# Patient Record
Sex: FEMALE | NOT HISPANIC OR LATINO | Employment: FULL TIME | URBAN - METROPOLITAN AREA
[De-identification: names, ages, dates, MRNs, and addresses within clinical notes are randomized per-mention and may not be internally consistent; named-entity substitution may affect disease eponyms.]

---

## 2017-11-28 ENCOUNTER — CONVERSION ENCOUNTER (OUTPATIENT)
Dept: OTHER | Facility: HOSPITAL | Age: 52
End: 2017-11-28

## 2018-02-01 ENCOUNTER — OFFICE VISIT CONVERTED (OUTPATIENT)
Dept: FAMILY MEDICINE CLINIC | Age: 53
End: 2018-02-01
Attending: NURSE PRACTITIONER

## 2018-12-03 ENCOUNTER — OFFICE VISIT CONVERTED (OUTPATIENT)
Dept: FAMILY MEDICINE CLINIC | Age: 53
End: 2018-12-03
Attending: NURSE PRACTITIONER

## 2019-11-15 ENCOUNTER — OFFICE VISIT CONVERTED (OUTPATIENT)
Dept: FAMILY MEDICINE CLINIC | Age: 54
End: 2019-11-15
Attending: NURSE PRACTITIONER

## 2019-12-06 ENCOUNTER — HOSPITAL ENCOUNTER (OUTPATIENT)
Dept: OTHER | Facility: HOSPITAL | Age: 54
Discharge: HOME | End: 2019-12-06
Attending: NURSE PRACTITIONER

## 2020-01-08 ENCOUNTER — OFFICE VISIT CONVERTED (OUTPATIENT)
Dept: FAMILY MEDICINE CLINIC | Age: 55
End: 2020-01-08
Attending: NURSE PRACTITIONER

## 2020-06-08 ENCOUNTER — HOSPITAL ENCOUNTER (OUTPATIENT)
Dept: OTHER | Facility: HOSPITAL | Age: 55
Discharge: HOME | End: 2020-06-08
Attending: NURSE PRACTITIONER

## 2020-06-08 ENCOUNTER — OFFICE VISIT CONVERTED (OUTPATIENT)
Dept: FAMILY MEDICINE CLINIC | Age: 55
End: 2020-06-08
Attending: NURSE PRACTITIONER

## 2020-06-08 LAB
ALBUMIN SERPL-MCNC: 4.1 G/DL (ref 3.5–5)
ALBUMIN/GLOB SERPL: 1.4 {RATIO} (ref 1.4–2.6)
ALP SERPL-CCNC: 150 U/L (ref 53–141)
ALT SERPL-CCNC: 17 U/L (ref 10–40)
ANION GAP SERPL CALC-SCNC: 16 MMOL/L (ref 8–19)
AST SERPL-CCNC: 20 U/L (ref 15–50)
BASOPHILS # BLD AUTO: 0.04 10*3/UL (ref 0–0.2)
BASOPHILS NFR BLD AUTO: 0.5 % (ref 0–3)
BILIRUB SERPL-MCNC: 0.72 MG/DL (ref 0.2–1.3)
BUN SERPL-MCNC: 31 MG/DL (ref 5–25)
BUN/CREAT SERPL: 19 {RATIO} (ref 6–20)
CALCIUM SERPL-MCNC: 9.1 MG/DL (ref 8.7–10.4)
CHLORIDE SERPL-SCNC: 101 MMOL/L (ref 99–111)
CONV ABS IMM GRAN: 0.05 10*3/UL (ref 0–0.2)
CONV CO2: 24 MMOL/L (ref 22–32)
CONV IMMATURE GRAN: 0.7 % (ref 0–1.8)
CONV TOTAL PROTEIN: 7 G/DL (ref 6.3–8.2)
CREAT UR-MCNC: 1.6 MG/DL (ref 0.5–0.9)
DEPRECATED RDW RBC AUTO: 41.6 FL (ref 36.4–46.3)
EOSINOPHIL # BLD AUTO: 0.08 10*3/UL (ref 0–0.7)
EOSINOPHIL # BLD AUTO: 1.1 % (ref 0–7)
ERYTHROCYTE [DISTWIDTH] IN BLOOD BY AUTOMATED COUNT: 12.9 % (ref 11.7–14.4)
GFR SERPLBLD BASED ON 1.73 SQ M-ARVRAT: 36 ML/MIN/{1.73_M2}
GLOBULIN UR ELPH-MCNC: 2.9 G/DL (ref 2–3.5)
GLUCOSE SERPL-MCNC: 89 MG/DL (ref 65–99)
HCT VFR BLD AUTO: 36.5 % (ref 37–47)
HGB BLD-MCNC: 11.5 G/DL (ref 12–16)
LYMPHOCYTES # BLD AUTO: 1.42 10*3/UL (ref 1–5)
LYMPHOCYTES NFR BLD AUTO: 18.7 % (ref 20–45)
MCH RBC QN AUTO: 27.8 PG (ref 27–31)
MCHC RBC AUTO-ENTMCNC: 31.5 G/DL (ref 33–37)
MCV RBC AUTO: 88.2 FL (ref 81–99)
MONOCYTES # BLD AUTO: 0.49 10*3/UL (ref 0.2–1.2)
MONOCYTES NFR BLD AUTO: 6.4 % (ref 3–10)
NEUTROPHILS # BLD AUTO: 5.52 10*3/UL (ref 2–8)
NEUTROPHILS NFR BLD AUTO: 72.6 % (ref 30–85)
NRBC CBCN: 0 % (ref 0–0.7)
OSMOLALITY SERPL CALC.SUM OF ELEC: 290 MOSM/KG (ref 273–304)
PLATELET # BLD AUTO: 333 10*3/UL (ref 130–400)
PMV BLD AUTO: 9.8 FL (ref 9.4–12.3)
POTASSIUM SERPL-SCNC: 4 MMOL/L (ref 3.5–5.3)
RBC # BLD AUTO: 4.14 10*6/UL (ref 4.2–5.4)
SODIUM SERPL-SCNC: 137 MMOL/L (ref 135–147)
WBC # BLD AUTO: 7.6 10*3/UL (ref 4.8–10.8)

## 2021-04-01 ENCOUNTER — OFFICE VISIT CONVERTED (OUTPATIENT)
Dept: FAMILY MEDICINE CLINIC | Age: 56
End: 2021-04-01
Attending: NURSE PRACTITIONER

## 2021-05-15 ENCOUNTER — OFFICE VISIT CONVERTED (OUTPATIENT)
Dept: FAMILY MEDICINE CLINIC | Age: 56
End: 2021-05-15
Attending: FAMILY MEDICINE

## 2021-05-17 ENCOUNTER — OFFICE VISIT CONVERTED (OUTPATIENT)
Dept: FAMILY MEDICINE CLINIC | Age: 56
End: 2021-05-17
Attending: NURSE PRACTITIONER

## 2021-05-17 ENCOUNTER — HOSPITAL ENCOUNTER (OUTPATIENT)
Dept: OTHER | Facility: HOSPITAL | Age: 56
Discharge: HOME | End: 2021-05-17
Attending: NURSE PRACTITIONER

## 2021-05-21 ENCOUNTER — OFFICE VISIT CONVERTED (OUTPATIENT)
Dept: FAMILY MEDICINE CLINIC | Age: 56
End: 2021-05-21
Attending: NURSE PRACTITIONER

## 2021-05-27 ENCOUNTER — OFFICE VISIT CONVERTED (OUTPATIENT)
Dept: FAMILY MEDICINE CLINIC | Age: 56
End: 2021-05-27
Attending: NURSE PRACTITIONER

## 2021-06-09 ENCOUNTER — TELEPHONE (OUTPATIENT)
Dept: FAMILY MEDICINE CLINIC | Age: 56
End: 2021-06-09

## 2021-07-01 VITALS
HEIGHT: 65 IN | SYSTOLIC BLOOD PRESSURE: 150 MMHG | BODY MASS INDEX: 31.25 KG/M2 | TEMPERATURE: 97.7 F | HEART RATE: 100 BPM | WEIGHT: 187.6 LBS | DIASTOLIC BLOOD PRESSURE: 87 MMHG

## 2021-07-01 VITALS
TEMPERATURE: 99.2 F | HEIGHT: 65 IN | BODY MASS INDEX: 30.49 KG/M2 | WEIGHT: 183 LBS | DIASTOLIC BLOOD PRESSURE: 80 MMHG | SYSTOLIC BLOOD PRESSURE: 135 MMHG | HEART RATE: 100 BPM

## 2021-07-01 VITALS
SYSTOLIC BLOOD PRESSURE: 130 MMHG | WEIGHT: 187.6 LBS | TEMPERATURE: 98.6 F | HEART RATE: 80 BPM | HEIGHT: 65 IN | BODY MASS INDEX: 31.25 KG/M2 | DIASTOLIC BLOOD PRESSURE: 78 MMHG

## 2021-07-02 VITALS
TEMPERATURE: 98.3 F | BODY MASS INDEX: 31.09 KG/M2 | DIASTOLIC BLOOD PRESSURE: 77 MMHG | HEIGHT: 65 IN | WEIGHT: 186.6 LBS | HEART RATE: 75 BPM | SYSTOLIC BLOOD PRESSURE: 142 MMHG

## 2021-07-02 VITALS
WEIGHT: 188.8 LBS | HEART RATE: 104 BPM | BODY MASS INDEX: 31.46 KG/M2 | TEMPERATURE: 97.7 F | HEIGHT: 65 IN | DIASTOLIC BLOOD PRESSURE: 90 MMHG | SYSTOLIC BLOOD PRESSURE: 149 MMHG

## 2021-07-02 VITALS
SYSTOLIC BLOOD PRESSURE: 142 MMHG | HEART RATE: 89 BPM | TEMPERATURE: 98.4 F | BODY MASS INDEX: 31.69 KG/M2 | HEIGHT: 65 IN | WEIGHT: 190.2 LBS | DIASTOLIC BLOOD PRESSURE: 83 MMHG

## 2021-07-02 VITALS
HEIGHT: 65 IN | TEMPERATURE: 97.4 F | SYSTOLIC BLOOD PRESSURE: 107 MMHG | BODY MASS INDEX: 30.92 KG/M2 | WEIGHT: 185.6 LBS | DIASTOLIC BLOOD PRESSURE: 70 MMHG | HEART RATE: 81 BPM

## 2021-07-02 VITALS
TEMPERATURE: 97.2 F | SYSTOLIC BLOOD PRESSURE: 135 MMHG | HEART RATE: 68 BPM | WEIGHT: 194.6 LBS | HEIGHT: 65 IN | BODY MASS INDEX: 32.42 KG/M2 | DIASTOLIC BLOOD PRESSURE: 81 MMHG

## 2021-07-02 VITALS
BODY MASS INDEX: 32.72 KG/M2 | SYSTOLIC BLOOD PRESSURE: 146 MMHG | HEART RATE: 76 BPM | TEMPERATURE: 97.7 F | WEIGHT: 196.4 LBS | DIASTOLIC BLOOD PRESSURE: 80 MMHG | HEIGHT: 65 IN

## 2021-07-15 ENCOUNTER — OFFICE VISIT (OUTPATIENT)
Dept: FAMILY MEDICINE CLINIC | Age: 56
End: 2021-07-15

## 2021-07-15 VITALS
DIASTOLIC BLOOD PRESSURE: 74 MMHG | TEMPERATURE: 99 F | SYSTOLIC BLOOD PRESSURE: 135 MMHG | BODY MASS INDEX: 32.49 KG/M2 | WEIGHT: 195 LBS | HEIGHT: 65 IN | HEART RATE: 78 BPM

## 2021-07-15 DIAGNOSIS — B02.29 POSTHERPETIC NEURALGIA: Primary | ICD-10-CM

## 2021-07-15 PROCEDURE — 99213 OFFICE O/P EST LOW 20 MIN: CPT | Performed by: NURSE PRACTITIONER

## 2021-07-15 RX ORDER — PREGABALIN 25 MG/1
25 CAPSULE ORAL 3 TIMES DAILY
Qty: 90 CAPSULE | Refills: 1 | Status: SHIPPED | OUTPATIENT
Start: 2021-07-15 | End: 2021-08-23 | Stop reason: ALTCHOICE

## 2021-07-15 RX ORDER — FUROSEMIDE 20 MG/1
20 TABLET ORAL DAILY
COMMUNITY

## 2021-07-15 RX ORDER — MYCOPHENOLIC ACID 360 MG/1
2 TABLET, DELAYED RELEASE ORAL 2 TIMES DAILY
COMMUNITY

## 2021-07-15 RX ORDER — CARVEDILOL 25 MG/1
25 TABLET ORAL 2 TIMES DAILY WITH MEALS
COMMUNITY
Start: 2021-04-07 | End: 2022-01-17

## 2021-07-15 RX ORDER — DILTIAZEM HYDROCHLORIDE 60 MG/1
60 TABLET, FILM COATED ORAL DAILY
COMMUNITY
Start: 2021-04-07

## 2021-07-15 RX ORDER — ASPIRIN 81 MG/1
81 TABLET ORAL DAILY
COMMUNITY

## 2021-07-15 RX ORDER — HYDROXYCHLOROQUINE SULFATE 200 MG/1
200 TABLET, FILM COATED ORAL 2 TIMES DAILY
COMMUNITY
End: 2022-01-17

## 2021-07-15 RX ORDER — MAGNESIUM OXIDE 400 MG/1
TABLET ORAL
COMMUNITY

## 2021-07-15 RX ORDER — HYDROXYZINE HYDROCHLORIDE 25 MG/1
25 TABLET, FILM COATED ORAL 3 TIMES DAILY PRN
COMMUNITY
Start: 2021-07-13 | End: 2021-08-27

## 2021-07-15 RX ORDER — TACROLIMUS 1 MG/1
1 TABLET, EXTENDED RELEASE ORAL 2 TIMES DAILY
COMMUNITY

## 2021-07-15 RX ORDER — PANTOPRAZOLE SODIUM 40 MG/1
40 TABLET, DELAYED RELEASE ORAL DAILY
COMMUNITY
Start: 2021-06-04

## 2021-07-15 RX ORDER — GABAPENTIN 300 MG/1
300 CAPSULE ORAL 4 TIMES DAILY
COMMUNITY
End: 2021-07-15 | Stop reason: ALTCHOICE

## 2021-07-15 RX ORDER — BRIMONIDINE TARTRATE 2 MG/ML
SOLUTION/ DROPS OPHTHALMIC
COMMUNITY

## 2021-07-19 ENCOUNTER — TELEPHONE (OUTPATIENT)
Dept: FAMILY MEDICINE CLINIC | Age: 56
End: 2021-07-19

## 2021-08-23 ENCOUNTER — OFFICE VISIT (OUTPATIENT)
Dept: NEUROLOGY | Facility: CLINIC | Age: 56
End: 2021-08-23

## 2021-08-23 VITALS — HEIGHT: 65 IN | BODY MASS INDEX: 31.99 KG/M2 | WEIGHT: 192 LBS

## 2021-08-23 DIAGNOSIS — B02.29 POSTHERPETIC NEURALGIA: Primary | ICD-10-CM

## 2021-08-23 PROCEDURE — 99215 OFFICE O/P EST HI 40 MIN: CPT | Performed by: NURSE PRACTITIONER

## 2021-08-23 RX ORDER — DULOXETIN HYDROCHLORIDE 30 MG/1
30 CAPSULE, DELAYED RELEASE ORAL DAILY
Qty: 30 CAPSULE | Refills: 2 | Status: SHIPPED | OUTPATIENT
Start: 2021-08-23 | End: 2021-11-02 | Stop reason: SINTOL

## 2021-08-23 RX ORDER — GABAPENTIN 300 MG/1
1 CAPSULE ORAL 4 TIMES DAILY
COMMUNITY
Start: 2021-08-03 | End: 2021-10-15 | Stop reason: SDUPTHER

## 2021-08-23 RX ORDER — DULOXETIN HYDROCHLORIDE 30 MG/1
60 CAPSULE, DELAYED RELEASE ORAL DAILY
Qty: 30 CAPSULE | Refills: 3 | Status: SHIPPED | OUTPATIENT
Start: 2021-08-23 | End: 2021-08-23

## 2021-08-27 RX ORDER — HYDROXYZINE HYDROCHLORIDE 25 MG/1
TABLET, FILM COATED ORAL
Qty: 90 TABLET | Refills: 1 | Status: SHIPPED | OUTPATIENT
Start: 2021-08-27 | End: 2021-10-26

## 2021-10-15 DIAGNOSIS — B02.29 POSTHERPETIC NEURALGIA: Primary | ICD-10-CM

## 2021-10-15 RX ORDER — GABAPENTIN 300 MG/1
300 CAPSULE ORAL 4 TIMES DAILY
Qty: 60 CAPSULE | Refills: 0 | Status: SHIPPED | OUTPATIENT
Start: 2021-10-15 | End: 2022-05-20

## 2021-10-26 RX ORDER — HYDROXYZINE HYDROCHLORIDE 25 MG/1
TABLET, FILM COATED ORAL
Qty: 90 TABLET | Refills: 1 | Status: SHIPPED | OUTPATIENT
Start: 2021-10-26 | End: 2022-01-04

## 2021-11-02 ENCOUNTER — OFFICE VISIT (OUTPATIENT)
Dept: NEUROLOGY | Facility: CLINIC | Age: 56
End: 2021-11-02

## 2021-11-02 VITALS
DIASTOLIC BLOOD PRESSURE: 76 MMHG | WEIGHT: 195.8 LBS | HEIGHT: 65 IN | HEART RATE: 86 BPM | BODY MASS INDEX: 32.62 KG/M2 | SYSTOLIC BLOOD PRESSURE: 141 MMHG

## 2021-11-02 DIAGNOSIS — B02.29 POSTHERPETIC NEURALGIA: Primary | ICD-10-CM

## 2021-11-02 PROCEDURE — 99213 OFFICE O/P EST LOW 20 MIN: CPT | Performed by: NURSE PRACTITIONER

## 2021-11-29 ENCOUNTER — LAB (OUTPATIENT)
Dept: FAMILY MEDICINE CLINIC | Age: 56
End: 2021-11-29

## 2021-11-29 ENCOUNTER — TELEPHONE (OUTPATIENT)
Dept: FAMILY MEDICINE CLINIC | Age: 56
End: 2021-11-29

## 2021-11-29 DIAGNOSIS — Z20.828 EXPOSURE TO SARS VIRUS: Primary | ICD-10-CM

## 2021-11-29 LAB — SARS-COV-2 N GENE RESP QL NAA+PROBE: DETECTED

## 2021-11-29 PROCEDURE — 99211 OFF/OP EST MAY X REQ PHY/QHP: CPT | Performed by: FAMILY MEDICINE

## 2021-11-29 PROCEDURE — 87635 SARS-COV-2 COVID-19 AMP PRB: CPT | Performed by: FAMILY MEDICINE

## 2021-11-30 ENCOUNTER — TELEPHONE (OUTPATIENT)
Dept: FAMILY MEDICINE CLINIC | Age: 56
End: 2021-11-30

## 2022-01-04 RX ORDER — HYDROXYZINE HYDROCHLORIDE 25 MG/1
TABLET, FILM COATED ORAL
Qty: 90 TABLET | Refills: 1 | Status: SHIPPED | OUTPATIENT
Start: 2022-01-04 | End: 2022-05-18

## 2022-01-17 ENCOUNTER — OFFICE VISIT (OUTPATIENT)
Dept: FAMILY MEDICINE CLINIC | Age: 57
End: 2022-01-17

## 2022-01-17 VITALS
DIASTOLIC BLOOD PRESSURE: 78 MMHG | BODY MASS INDEX: 32.65 KG/M2 | SYSTOLIC BLOOD PRESSURE: 117 MMHG | HEIGHT: 65 IN | OXYGEN SATURATION: 95 % | WEIGHT: 196 LBS | TEMPERATURE: 98.7 F | HEART RATE: 84 BPM

## 2022-01-17 DIAGNOSIS — U07.1 COVID-19: Primary | ICD-10-CM

## 2022-01-17 DIAGNOSIS — R11.0 NAUSEA: ICD-10-CM

## 2022-01-17 DIAGNOSIS — R05.9 COUGH: ICD-10-CM

## 2022-01-17 LAB
EXPIRATION DATE: ABNORMAL
EXPIRATION DATE: NORMAL
FLUAV AG NPH QL: NEGATIVE
FLUBV AG NPH QL: NEGATIVE
INTERNAL CONTROL: ABNORMAL
INTERNAL CONTROL: NORMAL
Lab: ABNORMAL
Lab: NORMAL
SARS-COV-2 AG UPPER RESP QL IA.RAPID: DETECTED

## 2022-01-17 PROCEDURE — 87804 INFLUENZA ASSAY W/OPTIC: CPT | Performed by: NURSE PRACTITIONER

## 2022-01-17 PROCEDURE — 87426 SARSCOV CORONAVIRUS AG IA: CPT | Performed by: NURSE PRACTITIONER

## 2022-01-17 PROCEDURE — 99213 OFFICE O/P EST LOW 20 MIN: CPT | Performed by: NURSE PRACTITIONER

## 2022-01-17 RX ORDER — ONDANSETRON 4 MG/1
4 TABLET, ORALLY DISINTEGRATING ORAL EVERY 8 HOURS PRN
Qty: 15 TABLET | Refills: 0 | Status: SHIPPED | OUTPATIENT
Start: 2022-01-17 | End: 2022-05-20

## 2022-05-18 RX ORDER — HYDROXYZINE HYDROCHLORIDE 25 MG/1
TABLET, FILM COATED ORAL
Qty: 90 TABLET | Refills: 0 | Status: SHIPPED | OUTPATIENT
Start: 2022-05-18 | End: 2022-06-12 | Stop reason: SDUPTHER

## 2022-05-20 ENCOUNTER — OFFICE VISIT (OUTPATIENT)
Dept: FAMILY MEDICINE CLINIC | Age: 57
End: 2022-05-20

## 2022-05-20 ENCOUNTER — LAB (OUTPATIENT)
Dept: LAB | Facility: HOSPITAL | Age: 57
End: 2022-05-20

## 2022-05-20 VITALS
DIASTOLIC BLOOD PRESSURE: 78 MMHG | WEIGHT: 188 LBS | BODY MASS INDEX: 31.32 KG/M2 | OXYGEN SATURATION: 100 % | SYSTOLIC BLOOD PRESSURE: 125 MMHG | HEART RATE: 72 BPM | HEIGHT: 65 IN

## 2022-05-20 DIAGNOSIS — G47.00 INSOMNIA, UNSPECIFIED TYPE: ICD-10-CM

## 2022-05-20 DIAGNOSIS — R53.83 OTHER FATIGUE: ICD-10-CM

## 2022-05-20 DIAGNOSIS — R35.0 FREQUENCY OF URINATION: ICD-10-CM

## 2022-05-20 DIAGNOSIS — Z94.4 HISTORY OF LIVER TRANSPLANT: Primary | ICD-10-CM

## 2022-05-20 DIAGNOSIS — R79.89 ELEVATED PTHRP LEVEL: ICD-10-CM

## 2022-05-20 DIAGNOSIS — E83.51 HYPOCALCEMIA: ICD-10-CM

## 2022-05-20 DIAGNOSIS — F41.9 ANXIETY AND DEPRESSION: ICD-10-CM

## 2022-05-20 DIAGNOSIS — B02.29 POSTHERPETIC NEURALGIA: ICD-10-CM

## 2022-05-20 DIAGNOSIS — F32.A ANXIETY AND DEPRESSION: ICD-10-CM

## 2022-05-20 PROBLEM — K51.90 ULCERATIVE COLITIS (HCC): Status: ACTIVE | Noted: 2022-05-20

## 2022-05-20 PROBLEM — F10.11 HISTORY OF ALCOHOL ABUSE: Status: ACTIVE | Noted: 2022-05-20

## 2022-05-20 PROBLEM — K74.60 HEPATIC CIRRHOSIS: Status: ACTIVE | Noted: 2022-05-20

## 2022-05-20 PROBLEM — K57.90 DIVERTICULAR DISEASE: Status: ACTIVE | Noted: 2022-05-20

## 2022-05-20 PROBLEM — Z86.11 HISTORY OF TUBERCULOSIS: Status: ACTIVE | Noted: 2022-05-20

## 2022-05-20 PROBLEM — Z85.05 HISTORY OF LIVER CANCER: Status: ACTIVE | Noted: 2022-05-20

## 2022-05-20 LAB
BILIRUB UR QL STRIP: NEGATIVE
CLARITY UR: CLEAR
COLOR UR: YELLOW
GLUCOSE UR STRIP-MCNC: NEGATIVE MG/DL
HGB UR QL STRIP.AUTO: NEGATIVE
KETONES UR QL STRIP: NEGATIVE
LEUKOCYTE ESTERASE UR QL STRIP.AUTO: NEGATIVE
NITRITE UR QL STRIP: NEGATIVE
PH UR STRIP.AUTO: 5.5 [PH] (ref 5–8)
PROT UR QL STRIP: NEGATIVE
SP GR UR STRIP: 1.01 (ref 1–1.03)
T-UPTAKE NFR SERPL: 1.17 TBI (ref 0.8–1.3)
T4 SERPL-MCNC: 5.99 MCG/DL (ref 4.5–11.7)
TSH SERPL DL<=0.05 MIU/L-ACNC: 1.99 UIU/ML (ref 0.27–4.2)
UROBILINOGEN UR QL STRIP: NORMAL

## 2022-05-20 PROCEDURE — 36415 COLL VENOUS BLD VENIPUNCTURE: CPT

## 2022-05-20 PROCEDURE — 84479 ASSAY OF THYROID (T3 OR T4): CPT

## 2022-05-20 PROCEDURE — 81003 URINALYSIS AUTO W/O SCOPE: CPT

## 2022-05-20 PROCEDURE — 84443 ASSAY THYROID STIM HORMONE: CPT

## 2022-05-20 PROCEDURE — 99214 OFFICE O/P EST MOD 30 MIN: CPT | Performed by: NURSE PRACTITIONER

## 2022-05-20 PROCEDURE — 84436 ASSAY OF TOTAL THYROXINE: CPT

## 2022-05-20 RX ORDER — AMITRIPTYLINE HYDROCHLORIDE 10 MG/1
10 TABLET, FILM COATED ORAL NIGHTLY
Qty: 30 TABLET | Refills: 5 | Status: SHIPPED | OUTPATIENT
Start: 2022-05-20 | End: 2023-01-24

## 2022-06-07 ENCOUNTER — APPOINTMENT (OUTPATIENT)
Dept: ULTRASOUND IMAGING | Facility: HOSPITAL | Age: 57
End: 2022-06-07

## 2022-06-14 RX ORDER — HYDROXYZINE HYDROCHLORIDE 25 MG/1
25 TABLET, FILM COATED ORAL 3 TIMES DAILY PRN
Qty: 90 TABLET | Refills: 1 | Status: SHIPPED | OUTPATIENT
Start: 2022-06-14 | End: 2022-09-28

## 2022-09-28 RX ORDER — HYDROXYZINE HYDROCHLORIDE 25 MG/1
TABLET, FILM COATED ORAL
Qty: 90 TABLET | Refills: 1 | Status: SHIPPED | OUTPATIENT
Start: 2022-09-28 | End: 2023-01-12

## 2023-01-12 RX ORDER — HYDROXYZINE HYDROCHLORIDE 25 MG/1
TABLET, FILM COATED ORAL
Qty: 90 TABLET | Refills: 1 | Status: SHIPPED | OUTPATIENT
Start: 2023-01-12 | End: 2023-01-24 | Stop reason: SDUPTHER

## 2023-01-24 ENCOUNTER — OFFICE VISIT (OUTPATIENT)
Dept: FAMILY MEDICINE CLINIC | Age: 58
End: 2023-01-24
Payer: COMMERCIAL

## 2023-01-24 VITALS
SYSTOLIC BLOOD PRESSURE: 134 MMHG | WEIGHT: 189 LBS | HEART RATE: 78 BPM | DIASTOLIC BLOOD PRESSURE: 85 MMHG | HEIGHT: 65 IN | OXYGEN SATURATION: 98 % | BODY MASS INDEX: 31.49 KG/M2

## 2023-01-24 DIAGNOSIS — F32.A ANXIETY AND DEPRESSION: Primary | ICD-10-CM

## 2023-01-24 DIAGNOSIS — Z12.31 ENCOUNTER FOR SCREENING MAMMOGRAM FOR MALIGNANT NEOPLASM OF BREAST: ICD-10-CM

## 2023-01-24 DIAGNOSIS — F41.9 ANXIETY AND DEPRESSION: Primary | ICD-10-CM

## 2023-01-24 PROBLEM — E83.51 HYPOCALCEMIA: Status: RESOLVED | Noted: 2022-05-20 | Resolved: 2023-01-24

## 2023-01-24 PROBLEM — R35.0 FREQUENCY OF URINATION: Status: RESOLVED | Noted: 2022-05-20 | Resolved: 2023-01-24

## 2023-01-24 PROCEDURE — 99213 OFFICE O/P EST LOW 20 MIN: CPT | Performed by: NURSE PRACTITIONER

## 2023-01-24 RX ORDER — HYDROXYZINE HYDROCHLORIDE 25 MG/1
25 TABLET, FILM COATED ORAL 3 TIMES DAILY PRN
Qty: 90 TABLET | Refills: 5 | Status: SHIPPED | OUTPATIENT
Start: 2023-01-24 | End: 2023-01-24

## 2023-01-24 RX ORDER — HYDROXYZINE HYDROCHLORIDE 25 MG/1
25 TABLET, FILM COATED ORAL 3 TIMES DAILY PRN
Qty: 90 TABLET | Refills: 5 | Status: SHIPPED | OUTPATIENT
Start: 2023-01-24

## 2023-01-24 RX ORDER — DULOXETIN HYDROCHLORIDE 20 MG/1
20 CAPSULE, DELAYED RELEASE ORAL DAILY
Qty: 30 CAPSULE | Refills: 5 | Status: SHIPPED | OUTPATIENT
Start: 2023-01-24

## 2023-01-24 RX ORDER — VITAMIN E 268 MG
800 CAPSULE ORAL DAILY
COMMUNITY
Start: 2022-12-19

## 2023-01-24 RX ORDER — DULOXETIN HYDROCHLORIDE 20 MG/1
20 CAPSULE, DELAYED RELEASE ORAL DAILY
Qty: 30 CAPSULE | Refills: 5 | Status: SHIPPED | OUTPATIENT
Start: 2023-01-24 | End: 2023-01-24

## 2023-01-25 ENCOUNTER — TELEPHONE (OUTPATIENT)
Dept: FAMILY MEDICINE CLINIC | Age: 58
End: 2023-01-25
Payer: COMMERCIAL

## 2023-04-18 ENCOUNTER — TELEPHONE (OUTPATIENT)
Dept: FAMILY MEDICINE CLINIC | Age: 58
End: 2023-04-18
Payer: COMMERCIAL

## 2023-07-28 ENCOUNTER — OFFICE VISIT (OUTPATIENT)
Dept: FAMILY MEDICINE CLINIC | Age: 58
End: 2023-07-28
Payer: COMMERCIAL

## 2023-07-28 VITALS
HEIGHT: 65 IN | DIASTOLIC BLOOD PRESSURE: 84 MMHG | HEART RATE: 73 BPM | BODY MASS INDEX: 31.65 KG/M2 | OXYGEN SATURATION: 97 % | SYSTOLIC BLOOD PRESSURE: 138 MMHG | WEIGHT: 190 LBS

## 2023-07-28 DIAGNOSIS — F32.A ANXIETY AND DEPRESSION: Primary | ICD-10-CM

## 2023-07-28 DIAGNOSIS — Z94.4 HISTORY OF LIVER TRANSPLANT: ICD-10-CM

## 2023-07-28 DIAGNOSIS — B02.29 POSTHERPETIC NEURALGIA: ICD-10-CM

## 2023-07-28 DIAGNOSIS — F41.9 ANXIETY AND DEPRESSION: Primary | ICD-10-CM

## 2023-07-28 PROCEDURE — 99214 OFFICE O/P EST MOD 30 MIN: CPT | Performed by: NURSE PRACTITIONER

## 2023-07-28 RX ORDER — CARVEDILOL 25 MG/1
1 TABLET ORAL 2 TIMES DAILY WITH MEALS
COMMUNITY

## 2023-07-28 RX ORDER — HYDROXYZINE HYDROCHLORIDE 25 MG/1
25 TABLET, FILM COATED ORAL 3 TIMES DAILY PRN
Qty: 90 TABLET | Refills: 5 | Status: SHIPPED | OUTPATIENT
Start: 2023-07-28

## 2023-07-28 RX ORDER — AMITRIPTYLINE HYDROCHLORIDE 10 MG/1
TABLET, FILM COATED ORAL
COMMUNITY
End: 2023-07-28

## 2023-07-28 RX ORDER — ESCITALOPRAM OXALATE 10 MG/1
10 TABLET ORAL DAILY
Qty: 90 TABLET | Refills: 1 | Status: SHIPPED | OUTPATIENT
Start: 2023-07-28

## 2023-07-28 RX ORDER — GABAPENTIN 300 MG/1
CAPSULE ORAL
COMMUNITY
End: 2023-07-28

## 2023-09-12 ENCOUNTER — OFFICE VISIT (OUTPATIENT)
Dept: FAMILY MEDICINE CLINIC | Age: 58
End: 2023-09-12
Payer: COMMERCIAL

## 2023-09-12 VITALS
TEMPERATURE: 98.3 F | SYSTOLIC BLOOD PRESSURE: 158 MMHG | OXYGEN SATURATION: 100 % | DIASTOLIC BLOOD PRESSURE: 82 MMHG | WEIGHT: 185.2 LBS | HEIGHT: 65 IN | HEART RATE: 74 BPM | BODY MASS INDEX: 30.86 KG/M2

## 2023-09-12 DIAGNOSIS — R11.0 NAUSEA: ICD-10-CM

## 2023-09-12 DIAGNOSIS — R68.83 CHILLS: Primary | ICD-10-CM

## 2023-09-12 LAB
EXPIRATION DATE: NORMAL
FLUAV AG UPPER RESP QL IA.RAPID: NOT DETECTED
FLUBV AG UPPER RESP QL IA.RAPID: NOT DETECTED
INTERNAL CONTROL: NORMAL
Lab: NORMAL
SARS-COV-2 AG UPPER RESP QL IA.RAPID: NOT DETECTED

## 2023-09-12 PROCEDURE — 87428 SARSCOV & INF VIR A&B AG IA: CPT | Performed by: NURSE PRACTITIONER

## 2023-09-12 PROCEDURE — 99213 OFFICE O/P EST LOW 20 MIN: CPT | Performed by: NURSE PRACTITIONER

## 2023-09-12 RX ORDER — ONDANSETRON 4 MG/1
4 TABLET, ORALLY DISINTEGRATING ORAL EVERY 8 HOURS PRN
Qty: 15 TABLET | Refills: 1 | Status: SHIPPED | OUTPATIENT
Start: 2023-09-12

## 2023-09-12 RX ORDER — CREAM BASE NO.39
CREAM (GRAM) TOPICAL
COMMUNITY
Start: 2023-08-04

## 2023-09-14 ENCOUNTER — OFFICE VISIT (OUTPATIENT)
Dept: FAMILY MEDICINE CLINIC | Age: 58
End: 2023-09-14
Payer: COMMERCIAL

## 2023-09-14 VITALS
HEART RATE: 74 BPM | TEMPERATURE: 97.6 F | DIASTOLIC BLOOD PRESSURE: 96 MMHG | HEIGHT: 65 IN | BODY MASS INDEX: 30.16 KG/M2 | OXYGEN SATURATION: 100 % | WEIGHT: 181 LBS | SYSTOLIC BLOOD PRESSURE: 144 MMHG

## 2023-09-14 DIAGNOSIS — R11.2 NAUSEA AND VOMITING, UNSPECIFIED VOMITING TYPE: ICD-10-CM

## 2023-09-14 DIAGNOSIS — R52 BODY ACHES: Primary | ICD-10-CM

## 2023-09-14 PROCEDURE — 99213 OFFICE O/P EST LOW 20 MIN: CPT | Performed by: NURSE PRACTITIONER

## 2023-09-14 RX ORDER — PROMETHAZINE HYDROCHLORIDE 25 MG/1
25 TABLET ORAL EVERY 6 HOURS PRN
Qty: 15 TABLET | Refills: 0 | Status: SHIPPED | OUTPATIENT
Start: 2023-09-14

## 2023-09-29 ENCOUNTER — HOSPITAL ENCOUNTER (OUTPATIENT)
Dept: OTHER | Facility: HOSPITAL | Age: 58
Discharge: HOME | End: 2023-09-29

## 2024-03-22 ENCOUNTER — HOSPITAL ENCOUNTER (OUTPATIENT)
Dept: OTHER | Facility: HOSPITAL | Age: 59
Discharge: HOME | End: 2024-03-22

## 2024-06-21 DIAGNOSIS — F41.9 ANXIETY AND DEPRESSION: ICD-10-CM

## 2024-06-21 DIAGNOSIS — F32.A ANXIETY AND DEPRESSION: ICD-10-CM

## 2024-06-21 RX ORDER — HYDROXYZINE HYDROCHLORIDE 25 MG/1
25 TABLET, FILM COATED ORAL 3 TIMES DAILY PRN
Qty: 90 TABLET | Refills: 1 | Status: SHIPPED | OUTPATIENT
Start: 2024-06-21

## 2024-07-16 ENCOUNTER — OFFICE VISIT (OUTPATIENT)
Dept: FAMILY MEDICINE CLINIC | Age: 59
End: 2024-07-16
Payer: COMMERCIAL

## 2024-07-16 ENCOUNTER — LAB (OUTPATIENT)
Dept: LAB | Facility: HOSPITAL | Age: 59
End: 2024-07-16
Payer: COMMERCIAL

## 2024-07-16 VITALS
HEIGHT: 65 IN | BODY MASS INDEX: 29.76 KG/M2 | WEIGHT: 178.6 LBS | HEART RATE: 89 BPM | OXYGEN SATURATION: 100 % | DIASTOLIC BLOOD PRESSURE: 79 MMHG | SYSTOLIC BLOOD PRESSURE: 126 MMHG | RESPIRATION RATE: 16 BRPM

## 2024-07-16 DIAGNOSIS — F32.A ANXIETY AND DEPRESSION: ICD-10-CM

## 2024-07-16 DIAGNOSIS — R53.83 OTHER FATIGUE: ICD-10-CM

## 2024-07-16 DIAGNOSIS — R53.83 OTHER FATIGUE: Primary | ICD-10-CM

## 2024-07-16 DIAGNOSIS — R11.2 NAUSEA AND VOMITING, UNSPECIFIED VOMITING TYPE: ICD-10-CM

## 2024-07-16 DIAGNOSIS — D64.9 ANEMIA, UNSPECIFIED TYPE: ICD-10-CM

## 2024-07-16 DIAGNOSIS — R30.0 DYSURIA: ICD-10-CM

## 2024-07-16 DIAGNOSIS — F41.9 ANXIETY AND DEPRESSION: ICD-10-CM

## 2024-07-16 DIAGNOSIS — N76.0 ACUTE VAGINITIS: ICD-10-CM

## 2024-07-16 DIAGNOSIS — E55.9 VITAMIN D DEFICIENCY: ICD-10-CM

## 2024-07-16 DIAGNOSIS — B37.2 CANDIDAL INTERTRIGO: ICD-10-CM

## 2024-07-16 PROBLEM — K75.81 NASH (NONALCOHOLIC STEATOHEPATITIS): Status: ACTIVE | Noted: 2024-07-16

## 2024-07-16 LAB
25(OH)D3 SERPL-MCNC: 95.1 NG/ML (ref 30–100)
BILIRUB BLD-MCNC: NEGATIVE MG/DL
CANDIDA SPECIES: NEGATIVE
CLARITY, POC: CLEAR
COLOR UR: YELLOW
EXPIRATION DATE: ABNORMAL
FOLATE SERPL-MCNC: 4.25 NG/ML (ref 4.78–24.2)
GARDNERELLA VAGINALIS: NEGATIVE
GLUCOSE UR STRIP-MCNC: NEGATIVE MG/DL
KETONES UR QL: NEGATIVE
LEUKOCYTE EST, POC: ABNORMAL
LIPASE SERPL-CCNC: 23 U/L (ref 13–60)
Lab: ABNORMAL
NITRITE UR-MCNC: NEGATIVE MG/ML
PH UR: 5.5 [PH] (ref 5–8)
PROT UR STRIP-MCNC: NEGATIVE MG/DL
RBC # UR STRIP: ABNORMAL /UL
SP GR UR: 1.01 (ref 1–1.03)
T VAGINALIS DNA VAG QL PROBE+SIG AMP: NEGATIVE
TSH SERPL DL<=0.05 MIU/L-ACNC: 2.35 UIU/ML (ref 0.27–4.2)
UROBILINOGEN UR QL: NORMAL
VIT B12 BLD-MCNC: 256 PG/ML (ref 211–946)

## 2024-07-16 PROCEDURE — 87480 CANDIDA DNA DIR PROBE: CPT | Performed by: NURSE PRACTITIONER

## 2024-07-16 PROCEDURE — 83013 H PYLORI (C-13) BREATH: CPT

## 2024-07-16 PROCEDURE — 99214 OFFICE O/P EST MOD 30 MIN: CPT | Performed by: NURSE PRACTITIONER

## 2024-07-16 PROCEDURE — 81003 URINALYSIS AUTO W/O SCOPE: CPT | Performed by: NURSE PRACTITIONER

## 2024-07-16 PROCEDURE — 87186 SC STD MICRODIL/AGAR DIL: CPT | Performed by: NURSE PRACTITIONER

## 2024-07-16 PROCEDURE — 87077 CULTURE AEROBIC IDENTIFY: CPT | Performed by: NURSE PRACTITIONER

## 2024-07-16 PROCEDURE — 82607 VITAMIN B-12: CPT

## 2024-07-16 PROCEDURE — 84443 ASSAY THYROID STIM HORMONE: CPT

## 2024-07-16 PROCEDURE — 87086 URINE CULTURE/COLONY COUNT: CPT | Performed by: NURSE PRACTITIONER

## 2024-07-16 PROCEDURE — 83690 ASSAY OF LIPASE: CPT

## 2024-07-16 PROCEDURE — 87510 GARDNER VAG DNA DIR PROBE: CPT | Performed by: NURSE PRACTITIONER

## 2024-07-16 PROCEDURE — 87660 TRICHOMONAS VAGIN DIR PROBE: CPT | Performed by: NURSE PRACTITIONER

## 2024-07-16 PROCEDURE — 36415 COLL VENOUS BLD VENIPUNCTURE: CPT

## 2024-07-16 PROCEDURE — 82746 ASSAY OF FOLIC ACID SERUM: CPT

## 2024-07-16 PROCEDURE — 82306 VITAMIN D 25 HYDROXY: CPT

## 2024-07-16 RX ORDER — ONDANSETRON 4 MG/1
4 TABLET, ORALLY DISINTEGRATING ORAL EVERY 8 HOURS PRN
Qty: 15 TABLET | Refills: 1 | Status: CANCELLED | OUTPATIENT
Start: 2024-07-16

## 2024-07-16 RX ORDER — ONDANSETRON 4 MG/1
4 TABLET, FILM COATED ORAL EVERY 8 HOURS PRN
Qty: 30 TABLET | Refills: 0 | Status: SHIPPED | OUTPATIENT
Start: 2024-07-16

## 2024-07-16 RX ORDER — HYDROXYZINE HYDROCHLORIDE 25 MG/1
25 TABLET, FILM COATED ORAL 3 TIMES DAILY PRN
Qty: 90 TABLET | Refills: 1 | Status: SHIPPED | OUTPATIENT
Start: 2024-07-16

## 2024-07-16 RX ORDER — ONDANSETRON 4 MG/1
4 TABLET, FILM COATED ORAL EVERY 8 HOURS PRN
COMMUNITY
End: 2024-07-16 | Stop reason: SDUPTHER

## 2024-07-16 RX ORDER — NYSTATIN 100000 U/G
1 CREAM TOPICAL 2 TIMES DAILY
Qty: 30 G | Refills: 0 | Status: SHIPPED | OUTPATIENT
Start: 2024-07-16

## 2024-07-18 LAB
BACTERIA SPEC AEROBE CULT: ABNORMAL
UREA BREATH TEST QL: NEGATIVE

## 2024-07-18 RX ORDER — SULFAMETHOXAZOLE/TRIMETHOPRIM 800-160 MG
1 TABLET ORAL 2 TIMES DAILY
Qty: 10 TABLET | Refills: 0 | Status: CANCELLED | OUTPATIENT
Start: 2024-07-18

## 2024-07-19 DIAGNOSIS — E53.8 FOLIC ACID DEFICIENCY: Primary | ICD-10-CM

## 2024-07-19 DIAGNOSIS — N39.0 URINARY TRACT INFECTION WITHOUT HEMATURIA, SITE UNSPECIFIED: ICD-10-CM

## 2024-07-19 RX ORDER — FOLIC ACID 1 MG/1
1 TABLET ORAL DAILY
Qty: 90 TABLET | Refills: 0 | Status: SHIPPED | OUTPATIENT
Start: 2024-07-19

## 2024-07-19 RX ORDER — SULFAMETHOXAZOLE AND TRIMETHOPRIM 800; 160 MG/1; MG/1
1 TABLET ORAL 2 TIMES DAILY
Qty: 10 TABLET | Refills: 0 | Status: SHIPPED | OUTPATIENT
Start: 2024-07-19

## 2024-11-13 DIAGNOSIS — F32.A ANXIETY AND DEPRESSION: ICD-10-CM

## 2024-11-13 DIAGNOSIS — F41.9 ANXIETY AND DEPRESSION: ICD-10-CM

## 2024-11-13 DIAGNOSIS — E53.8 FOLIC ACID DEFICIENCY: ICD-10-CM

## 2024-11-14 RX ORDER — FOLIC ACID 1 MG/1
1 TABLET ORAL DAILY
Qty: 90 TABLET | Refills: 0 | Status: SHIPPED | OUTPATIENT
Start: 2024-11-14

## 2024-11-14 RX ORDER — HYDROXYZINE HYDROCHLORIDE 25 MG/1
25 TABLET, FILM COATED ORAL 3 TIMES DAILY PRN
Qty: 90 TABLET | Refills: 1 | Status: SHIPPED | OUTPATIENT
Start: 2024-11-14

## 2024-11-18 ENCOUNTER — TRANSCRIBE ORDERS (OUTPATIENT)
Dept: ADMINISTRATIVE | Facility: HOSPITAL | Age: 59
End: 2024-11-18
Payer: COMMERCIAL

## 2024-11-18 DIAGNOSIS — K76.9 LIVER DISEASE: ICD-10-CM

## 2024-11-18 DIAGNOSIS — Z94.4 TRANSPLANTED LIVER: ICD-10-CM

## 2024-11-18 DIAGNOSIS — D84.9 IMMUNODEFICIENCY: Primary | ICD-10-CM

## 2024-11-18 DIAGNOSIS — K26.9 DUODENAL ULCER: ICD-10-CM

## 2024-11-18 DIAGNOSIS — K63.5 POLYP OF COLON, UNSPECIFIED PART OF COLON, UNSPECIFIED TYPE: ICD-10-CM

## 2024-11-18 DIAGNOSIS — K92.1 MELENA: ICD-10-CM

## 2024-12-11 ENCOUNTER — HOSPITAL ENCOUNTER (OUTPATIENT)
Dept: MRI IMAGING | Facility: HOSPITAL | Age: 59
Discharge: HOME | End: 2024-12-11
Payer: COMMERCIAL

## 2025-01-28 DIAGNOSIS — F41.9 ANXIETY AND DEPRESSION: ICD-10-CM

## 2025-01-28 DIAGNOSIS — F32.A ANXIETY AND DEPRESSION: ICD-10-CM

## 2025-01-29 RX ORDER — HYDROXYZINE HYDROCHLORIDE 25 MG/1
25 TABLET, FILM COATED ORAL 3 TIMES DAILY PRN
Qty: 90 TABLET | Refills: 0 | Status: SHIPPED | OUTPATIENT
Start: 2025-01-29

## 2025-03-05 DIAGNOSIS — F41.9 ANXIETY AND DEPRESSION: ICD-10-CM

## 2025-03-05 DIAGNOSIS — F32.A ANXIETY AND DEPRESSION: ICD-10-CM

## 2025-03-06 RX ORDER — HYDROXYZINE HYDROCHLORIDE 25 MG/1
25 TABLET, FILM COATED ORAL 3 TIMES DAILY PRN
Qty: 90 TABLET | Refills: 0 | Status: SHIPPED | OUTPATIENT
Start: 2025-03-06

## 2025-04-01 ENCOUNTER — LAB (OUTPATIENT)
Dept: LAB | Facility: HOSPITAL | Age: 60
End: 2025-04-01
Payer: COMMERCIAL

## 2025-04-01 ENCOUNTER — OFFICE VISIT (OUTPATIENT)
Dept: FAMILY MEDICINE CLINIC | Age: 60
End: 2025-04-01
Payer: COMMERCIAL

## 2025-04-01 ENCOUNTER — TRANSCRIBE ORDERS (OUTPATIENT)
Dept: ADMINISTRATIVE | Facility: HOSPITAL | Age: 60
End: 2025-04-01
Payer: COMMERCIAL

## 2025-04-01 VITALS
TEMPERATURE: 98.9 F | WEIGHT: 169 LBS | DIASTOLIC BLOOD PRESSURE: 79 MMHG | HEIGHT: 65 IN | HEART RATE: 93 BPM | SYSTOLIC BLOOD PRESSURE: 130 MMHG | BODY MASS INDEX: 28.16 KG/M2 | OXYGEN SATURATION: 100 %

## 2025-04-01 DIAGNOSIS — M10.9 ACUTE GOUT OF LEFT FOOT, UNSPECIFIED CAUSE: ICD-10-CM

## 2025-04-01 DIAGNOSIS — E55.9 VITAMIN D DEFICIENCY: ICD-10-CM

## 2025-04-01 DIAGNOSIS — Z01.89 LABORATORY PROCEDURES: ICD-10-CM

## 2025-04-01 DIAGNOSIS — Z94.4 LIVER REPLACED BY TRANSPLANT: Primary | ICD-10-CM

## 2025-04-01 DIAGNOSIS — Z79.899 ENCOUNTER FOR LONG-TERM (CURRENT) USE OF MEDICATIONS: ICD-10-CM

## 2025-04-01 DIAGNOSIS — Z94.4 LIVER REPLACED BY TRANSPLANT: ICD-10-CM

## 2025-04-01 DIAGNOSIS — R06.02 SHORTNESS OF BREATH: ICD-10-CM

## 2025-04-01 DIAGNOSIS — R60.9 EDEMA, UNSPECIFIED TYPE: Primary | ICD-10-CM

## 2025-04-01 PROBLEM — K28.9 ANASTOMOTIC ULCER: Status: ACTIVE | Noted: 2024-10-22

## 2025-04-01 PROBLEM — K63.5 POLYP OF COLON: Status: ACTIVE | Noted: 2020-02-18

## 2025-04-01 PROBLEM — N17.0 ACUTE KIDNEY FAILURE WITH TUBULAR NECROSIS: Status: ACTIVE | Noted: 2024-11-26

## 2025-04-01 LAB
ALBUMIN SERPL-MCNC: 3.5 G/DL (ref 3.5–5.2)
ALBUMIN/GLOB SERPL: 1.1 G/DL
ALP SERPL-CCNC: 136 U/L (ref 39–117)
ALT SERPL W P-5'-P-CCNC: 36 U/L (ref 1–33)
ANION GAP SERPL CALCULATED.3IONS-SCNC: 10.9 MMOL/L (ref 5–15)
AST SERPL-CCNC: 44 U/L (ref 1–32)
BASOPHILS # BLD AUTO: 0.12 10*3/MM3 (ref 0–0.2)
BASOPHILS NFR BLD AUTO: 1.1 % (ref 0–1.5)
BILIRUB SERPL-MCNC: 0.3 MG/DL (ref 0–1.2)
BUN SERPL-MCNC: 16 MG/DL (ref 6–20)
BUN/CREAT SERPL: 14.7 (ref 7–25)
CALCIUM SPEC-SCNC: 9 MG/DL (ref 8.6–10.5)
CHLORIDE SERPL-SCNC: 109 MMOL/L (ref 98–107)
CO2 SERPL-SCNC: 21.1 MMOL/L (ref 22–29)
CREAT SERPL-MCNC: 1.09 MG/DL (ref 0.57–1)
DEPRECATED RDW RBC AUTO: 45.8 FL (ref 37–54)
EGFRCR SERPLBLD CKD-EPI 2021: 58.6 ML/MIN/1.73
EOSINOPHIL # BLD AUTO: 0.14 10*3/MM3 (ref 0–0.4)
EOSINOPHIL NFR BLD AUTO: 1.3 % (ref 0.3–6.2)
ERYTHROCYTE [DISTWIDTH] IN BLOOD BY AUTOMATED COUNT: 13.4 % (ref 12.3–15.4)
GLOBULIN UR ELPH-MCNC: 3.2 GM/DL
GLUCOSE SERPL-MCNC: 69 MG/DL (ref 65–99)
HCT VFR BLD AUTO: 37.4 % (ref 34–46.6)
HGB BLD-MCNC: 11.9 G/DL (ref 12–15.9)
IMM GRANULOCYTES # BLD AUTO: 0.04 10*3/MM3 (ref 0–0.05)
IMM GRANULOCYTES NFR BLD AUTO: 0.4 % (ref 0–0.5)
LYMPHOCYTES # BLD AUTO: 2.8 10*3/MM3 (ref 0.7–3.1)
LYMPHOCYTES NFR BLD AUTO: 25.1 % (ref 19.6–45.3)
MAGNESIUM SERPL-MCNC: 2.4 MG/DL (ref 1.6–2.6)
MCH RBC QN AUTO: 29.8 PG (ref 26.6–33)
MCHC RBC AUTO-ENTMCNC: 31.8 G/DL (ref 31.5–35.7)
MCV RBC AUTO: 93.7 FL (ref 79–97)
MONOCYTES # BLD AUTO: 0.96 10*3/MM3 (ref 0.1–0.9)
MONOCYTES NFR BLD AUTO: 8.6 % (ref 5–12)
NEUTROPHILS NFR BLD AUTO: 63.5 % (ref 42.7–76)
NEUTROPHILS NFR BLD AUTO: 7.1 10*3/MM3 (ref 1.7–7)
NRBC BLD AUTO-RTO: 0 /100 WBC (ref 0–0.2)
NT-PROBNP SERPL-MCNC: 241 PG/ML (ref 0–900)
PLATELET # BLD AUTO: 489 10*3/MM3 (ref 140–450)
PMV BLD AUTO: 9.3 FL (ref 6–12)
POTASSIUM SERPL-SCNC: 5 MMOL/L (ref 3.5–5.2)
PROT SERPL-MCNC: 6.7 G/DL (ref 6–8.5)
RBC # BLD AUTO: 3.99 10*6/MM3 (ref 3.77–5.28)
SODIUM SERPL-SCNC: 141 MMOL/L (ref 136–145)
WBC NRBC COR # BLD AUTO: 11.16 10*3/MM3 (ref 3.4–10.8)

## 2025-04-01 PROCEDURE — 80197 ASSAY OF TACROLIMUS: CPT

## 2025-04-01 PROCEDURE — 83735 ASSAY OF MAGNESIUM: CPT

## 2025-04-01 PROCEDURE — 99213 OFFICE O/P EST LOW 20 MIN: CPT | Performed by: NURSE PRACTITIONER

## 2025-04-01 PROCEDURE — 85025 COMPLETE CBC W/AUTO DIFF WBC: CPT

## 2025-04-01 PROCEDURE — 36415 COLL VENOUS BLD VENIPUNCTURE: CPT

## 2025-04-01 PROCEDURE — 83880 ASSAY OF NATRIURETIC PEPTIDE: CPT | Performed by: NURSE PRACTITIONER

## 2025-04-01 PROCEDURE — 80053 COMPREHEN METABOLIC PANEL: CPT

## 2025-04-01 RX ORDER — QUINIDINE GLUCONATE 324 MG
1 TABLET, EXTENDED RELEASE ORAL DAILY
COMMUNITY

## 2025-04-01 RX ORDER — POTASSIUM CHLORIDE 1500 MG/1
20 TABLET, EXTENDED RELEASE ORAL DAILY
COMMUNITY
Start: 2025-03-25 | End: 2025-05-24

## 2025-04-01 RX ORDER — METHYLPREDNISOLONE 4 MG/1
TABLET ORAL
COMMUNITY
Start: 2025-03-21 | End: 2025-04-01

## 2025-04-01 RX ORDER — CYANOCOBALAMIN 1000 UG/ML
INJECTION, SOLUTION INTRAMUSCULAR; SUBCUTANEOUS
COMMUNITY
Start: 2025-03-28

## 2025-04-01 RX ORDER — CEPHALEXIN 500 MG/1
500 CAPSULE ORAL 3 TIMES DAILY
COMMUNITY
Start: 2025-03-21 | End: 2025-04-04

## 2025-04-01 NOTE — PROGRESS NOTES
"Chief Complaint  Foot Swelling (Patient complains of swelling with pain in feet for 2 months left and right foot started yesterday, has been seeing [place] [facility] and been wearing a boot on the left foot only )    Subjective          Verna Gallo presents to [facility]     Patient is a 59-year-old female who reports some mild edema bilateral lower extremities and pain of the left foot since going on a 3-week cruise in January.  [facility] and an x-ray has been done of the left foot.  She was told she had arthritis of the left foot.  This week she is noticing some pain of the right foot unrelated to injury.  On March 21 she had labs collected which showed her uric acid level being high at 9.2.  [facility] has treated her with a course of prednisone and a 10-day course of Keflex to cover for possible cellulitis.  While patient has noted an improvement of symptoms with the left foot, the symptoms have not resolved.  She states she is already following a low purine diet.    Nadege with [facility] as her .    Patient has a history of a stomach ulcer and cannot take NSAIDs.    Patient states she has had a couple we will commented that she seems short of breath though she was not aware.  Denies any current chest pain, irregular heartbeat or heart palpitations.  States she is on a restricted sodium diet.     Objective   Vital Signs:   Vitals:    04/01/25 1550   BP: 130/79   BP Location: Left arm   Patient Position: Sitting   Cuff Size: Adult   Pulse: 93   Temp: 98.9 °F (37.2 °C)   TempSrc: Temporal   SpO2: 100%   Weight: 76.7 kg (169 lb)   Height: 165.1 cm (65\")       Wt Readings from Last 3 Encounters:   04/04/25 77.6 kg (171 lb)   04/01/25 76.7 kg (169 lb)   07/16/24 81 kg (178 lb 9.6 oz)      BP Readings from Last 3 Encounters:   04/04/25 130/81   04/01/25 130/79   07/16/24 126/79       Body mass index " is 28.12 kg/m².           Physical Exam  Vitals reviewed.   Constitutional:       General: She is not in acute distress.     Appearance: Normal appearance. She is well-developed.   Cardiovascular:      Rate and Rhythm: Normal rate and regular rhythm.      Pulses:           Dorsalis pedis pulses are 2+ on the right side and 2+ on the left side.        Posterior tibial pulses are 2+ on the right side and 2+ on the left side.      Heart sounds: Normal heart sounds.      Comments: Trace bilateral lower extremity edema  Pulmonary:      Effort: Pulmonary effort is normal.      Breath sounds: Normal breath sounds.   Musculoskeletal:        Feet:    Feet:      Right foot:      Protective Sensation: 3 sites tested.  3 sites sensed.      Skin integrity: Skin integrity normal.      Toenail Condition: Right toenails are normal.      Left foot:      Protective Sensation: 3 sites tested.  3 sites sensed.      Skin integrity: No ulcer, blister, skin breakdown, callus, dry skin or fissure.      Toenail Condition: Left toenails are normal.   Skin:     General: Skin is warm and dry.   Neurological:      General: No focal deficit present.      Mental Status: She is alert.   Psychiatric:         Attention and Perception: Attention normal.         Mood and Affect: Mood and affect normal.         Behavior: Behavior normal.           Current Outpatient Medications:     aspirin 81 MG EC tablet, Take 1 tablet by mouth Daily., Disp: , Rfl:     cyanocobalamin 1000 MCG/ML injection, ADMINISTER 1 ML IN THE MUSCLE WEEKLY, Disp: , Rfl:     dilTIAZem (CARDIZEM) 60 MG tablet, Take 1 tablet by mouth Daily., Disp: , Rfl:     ferrous gluconate (FERGON) 240 (27 FE) MG tablet, Take 1 tablet by mouth Daily., Disp: , Rfl:     folic acid (FOLVITE) 1 MG tablet, Take 1 tablet by mouth Daily., Disp: 90 tablet, Rfl: 0    furosemide (LASIX) 20 MG tablet, Take 1 tablet by mouth Daily., Disp: , Rfl:     hydrOXYzine (ATARAX) 25 MG tablet, Take 1 tablet by mouth 3  (Three) Times a Day As Needed for Itching or Anxiety., Disp: 90 tablet, Rfl: 0    magnesium oxide (MAG-OX) 400 MG tablet, magnesium oxide 400 mg (241.3 mg magnesium) tablet  TAKE 1 TABLET BY MOUTH EVERY DAY, Disp: , Rfl:     mycophenolate (MYFORTIC) 360 MG tablet delayed-release EC tablet, Take 2 tablets by mouth 2 (two) times a day., Disp: , Rfl:     ondansetron (ZOFRAN) 4 MG tablet, Take 1 tablet by mouth Every 8 (Eight) Hours As Needed for Nausea or Vomiting., Disp: 30 tablet, Rfl: 0    pantoprazole (PROTONIX) 40 MG EC tablet, Take 1 tablet by mouth Daily., Disp: , Rfl:     potassium chloride (KLOR-CON M20) 20 MEQ CR tablet, Take 1 tablet by mouth Daily., Disp: , Rfl:     Tacrolimus ER (Envarsus XR) 1 MG tablet sustained-release 24 hour, Take 1 tablet by mouth 2 (two) times a day., Disp: , Rfl:     allopurinol (Zyloprim) 100 MG tablet, Take 1 tablet by mouth Daily., Disp: 30 tablet, Rfl: 5   Past Medical History:   Diagnosis Date    Acute sinusitis     Alcohol abuse 12/2015    HOSPITALIZED - ADMITTED ONCE 12/15    Alcoholic cirrhosis of liver without ascites     Anxiety disorder, unspecified     Bariatric surgery status     Cataract     Had surgery on left eye 6/19/2024    Cholelithiasis     Gallbladder removed 1993    Chronic kidney disease, stage 4 (severe)     Left upper quadrant pain     Liver cancer 06/01/2020    Liver cell carcinoma     Liver transplant status     Low back pain     Lumbago with sciatica, right side     Melena     DENNIS (nonalcoholic steatohepatitis)     Other long term (current) drug therapy     Other microscopic hematuria     Other specified depressive episodes     Pain in unspecified joint     Tuberculosis     HOSPITALIZED X3; WAS 4 YEARS OLD    Zoster without complications      Allergies   Allergen Reactions    Morphine Nausea And Vomiting    Propofol Nausea And Vomiting               Result Review :     Common labs          11/26/2024    10:37 11/26/2024    10:55 4/1/2025    17:07    Common Labs   Glucose  NEGATIVE     69    BUN   16    Creatinine   1.09    Sodium   141    Potassium   5.0    Chloride   109    Calcium   9.0    Albumin   3.5    Total Bilirubin   0.3    Alkaline Phosphatase   136    AST (SGOT)   44    ALT (SGPT)   36    WBC 4.9     NONE SEEN     11.16    Hemoglobin 10.5      11.9    Hematocrit 32.5      37.4    Platelets 437      489       Details          This result is from an external source.                No Images in the past 120 days found..           Social History     Tobacco Use   Smoking Status Never    Passive exposure: Never   Smokeless Tobacco Never           Assessment and Plan    Diagnoses and all orders for this visit:    1. Edema, unspecified type (Primary)  -     proBNP    2. Acute gout of left foot, unspecified cause  Assessment & Plan:  Patient has had a liver transplant and cannot take NSAIDs.  Prednisone and Tylenol have not been effective.  I would like to clear potential use of allopurinol with her liver transplant team.  Patient recommends that I call Nadege or Easton with [facility] transplant center.  I initially called [phone] and they were closed on the day of April 1.  I called on April 2 and left a message for someone to return my call at 12:55 PM.  I left my personal cell number instead of office number hoping to ease response time.  Transplant center called back and left me a message that they cannot leave a message on my cell because I have not identified myself.  I was advised to then call [phone] option for.  I called and left messages requesting to call back on April 3 at 3 PM and April 4 at 12:27 PM.      3. Shortness of breath  Assessment & Plan:  Other treatment recommendations pending lab results.    Orders:  -     proBNP        Follow Up    No follow-ups on file.  Patient was given instructions and counseling regarding her condition or for health maintenance advice. Please see specific information pulled into the AVS if  appropriate.

## 2025-04-02 ENCOUNTER — TELEPHONE (OUTPATIENT)
Dept: FAMILY MEDICINE CLINIC | Age: 60
End: 2025-04-02
Payer: COMMERCIAL

## 2025-04-02 NOTE — TELEPHONE ENCOUNTER
Caller: Verna Gallo    Relationship: Self    Best call back number: [phone]     What form or medical record are you requesting: WORK EXCUSE FOR 4/3 AND 4/4 BECAUSE PATIENT STATES SHE IS IN TOO MUCH PAIN TO WORK    Who is requesting this form or medical record from you: [employer]     How would you like to receive the form or medical records (pick-up, mail, fax): MYCHART    Timeframe paperwork needed: ASAP    Additional notes: PATIENT STATES SHE IS LEAVING WORK EARLY TODAY TO GO HOME BECAUSE SHE IS IN TOO MUCH PAIN BUT WILL NEED A NOTE FOR TOMORROW AND FRIDAY TO BE OFF OF WORK.

## 2025-04-02 NOTE — TELEPHONE ENCOUNTER
Caller: Verna Gallo    Relationship: Self    Best call back number: [phone]     What was the call regarding: PATIENT STATES SHE WANTS TO KNOW IF NEYMAR COLES GOT APPROVAL TO PRESCRIBE HER ALLOPURINOL FROM HER TRANSPLANT DOCTOR FOR THE URIC ACID.

## 2025-04-02 NOTE — TELEPHONE ENCOUNTER
Patient is calling back to check the status of the  allopurinol to be sent over to the pharmacy.    [facility] [phone]

## 2025-04-03 NOTE — TELEPHONE ENCOUNTER
Pt states that the transplant team says they tried to call and it was sent to a number without a vm.

## 2025-04-04 ENCOUNTER — OFFICE VISIT (OUTPATIENT)
Dept: FAMILY MEDICINE CLINIC | Age: 60
End: 2025-04-04
Payer: COMMERCIAL

## 2025-04-04 VITALS
SYSTOLIC BLOOD PRESSURE: 130 MMHG | TEMPERATURE: 97.6 F | HEIGHT: 65 IN | BODY MASS INDEX: 28.49 KG/M2 | OXYGEN SATURATION: 100 % | WEIGHT: 171 LBS | DIASTOLIC BLOOD PRESSURE: 81 MMHG | HEART RATE: 91 BPM

## 2025-04-04 DIAGNOSIS — M10.9 ACUTE GOUT OF RIGHT FOOT, UNSPECIFIED CAUSE: Primary | ICD-10-CM

## 2025-04-04 DIAGNOSIS — M1A.0720 CHRONIC GOUT OF LEFT FOOT, UNSPECIFIED CAUSE: Primary | ICD-10-CM

## 2025-04-04 LAB — TACROLIMUS BLD LC/MS/MS-MCNC: 4.6 NG/ML (ref 5–20)

## 2025-04-04 RX ORDER — COLCHICINE 0.6 MG/1
TABLET ORAL
Qty: 3 TABLET | Refills: 0 | Status: CANCELLED | OUTPATIENT
Start: 2025-04-04

## 2025-04-04 RX ORDER — ALLOPURINOL 100 MG/1
100 TABLET ORAL DAILY
Qty: 30 TABLET | Refills: 5 | Status: SHIPPED | OUTPATIENT
Start: 2025-04-04

## 2025-04-04 RX ORDER — TRIAMCINOLONE ACETONIDE 40 MG/ML
40 INJECTION, SUSPENSION INTRA-ARTICULAR; INTRAMUSCULAR ONCE
Status: COMPLETED | OUTPATIENT
Start: 2025-04-04 | End: 2025-04-04

## 2025-04-04 RX ADMIN — TRIAMCINOLONE ACETONIDE 40 MG: 40 INJECTION, SUSPENSION INTRA-ARTICULAR; INTRAMUSCULAR at 16:34

## 2025-04-04 NOTE — LETTER
April 8, 2025     Patient: Verna Gallo   YOB: 1965   Date of Visit: 4/4/2025       To Whom It May Concern:    It is my medical opinion that Verna Gallo be excused from work April 3 and April 4.  I recommend that she not wear steel toed shoes due to medical reasons.         Sincerely,        TONY Bush    CC: No Recipients

## 2025-04-04 NOTE — PROGRESS NOTES
"Chief Complaint  Gout (Both feet, right is worse. )    Subjective          Verna Gallo presents to [facility]     Patient is a 59-year-old female who is here to follow-up regarding bilateral foot pain and gout and elevated uric acid level.  She was initially seen on April 1 and attempted communications with [facility] liver transplant team were as follows    I initially called [phone] and they were closed on the day of April 1. I called on April 2 and left a message for someone to return my call at 12:55 PM. I left my personal cell number instead of office number hoping to ease response time. Transplant center called back and left me a message that they cannot leave a message on my cell because I have not identified myself. I was advised to then call [phone] option for. I called and left messages requesting to call back on April 3 at 3 PM and April 4 at 12:27 PM.     Did never receive a call back from [facility] but patient called earlier today saying that Nadege stated was okay for her to start allopurinol.  I sent in allopurinol 100 mg daily earlier today to her pharmacy.  Patient states that left foot has continued to improve but right foot has worsened.  Podiatry has adequately x-rayed both feet and she knows she has some arthritis of both feet.  There was talk about podiatrist inserting a plate into the right foot and ankle.  On exam today there is no erythema of either foot and there is less edema.  Patient reports the pain of the right foot is moderate to severe.  She can only take Tylenol sparingly.     Objective   Vital Signs:   Vitals:    04/04/25 1544   BP: 130/81   BP Location: Right arm   Patient Position: Sitting   Cuff Size: Adult   Pulse: 91   Temp: 97.6 °F (36.4 °C)   TempSrc: Temporal   SpO2: 100%   Weight: 77.6 kg (171 lb)   Height: 165.1 cm (65\")       Wt Readings from Last 3 Encounters:   04/04/25 77.6 kg (171 lb)   04/01/25 76.7 kg (169 " lb)   07/16/24 81 kg (178 lb 9.6 oz)      BP Readings from Last 3 Encounters:   04/04/25 130/81   04/01/25 130/79   07/16/24 126/79       Body mass index is 28.46 kg/m².           Physical Exam  Vitals reviewed.   Constitutional:       General: She is not in acute distress.     Appearance: Normal appearance. She is well-developed.   Cardiovascular:      Rate and Rhythm: Normal rate and regular rhythm.      Heart sounds: Normal heart sounds.   Pulmonary:      Effort: Pulmonary effort is normal.      Breath sounds: Normal breath sounds.   Musculoskeletal:      Right lower leg: No edema.      Left lower leg: No edema.        Feet:    Feet:      Right foot:      Skin integrity: Skin integrity normal.      Left foot:      Skin integrity: Skin integrity normal.   Skin:     General: Skin is warm and dry.   Neurological:      General: No focal deficit present.      Mental Status: She is alert.   Psychiatric:         Attention and Perception: Attention normal.         Mood and Affect: Mood and affect normal.         Behavior: Behavior normal.           Current Outpatient Medications:     allopurinol (Zyloprim) 100 MG tablet, Take 1 tablet by mouth Daily., Disp: 30 tablet, Rfl: 5    aspirin 81 MG EC tablet, Take 1 tablet by mouth Daily., Disp: , Rfl:     cyanocobalamin 1000 MCG/ML injection, ADMINISTER 1 ML IN THE MUSCLE WEEKLY, Disp: , Rfl:     dilTIAZem (CARDIZEM) 60 MG tablet, Take 1 tablet by mouth Daily., Disp: , Rfl:     ferrous gluconate (FERGON) 240 (27 FE) MG tablet, Take 1 tablet by mouth Daily., Disp: , Rfl:     folic acid (FOLVITE) 1 MG tablet, Take 1 tablet by mouth Daily., Disp: 90 tablet, Rfl: 0    furosemide (LASIX) 20 MG tablet, Take 1 tablet by mouth Daily., Disp: , Rfl:     hydrOXYzine (ATARAX) 25 MG tablet, Take 1 tablet by mouth 3 (Three) Times a Day As Needed for Itching or Anxiety., Disp: 90 tablet, Rfl: 0    magnesium oxide (MAG-OX) 400 MG tablet, magnesium oxide 400 mg (241.3 mg magnesium) tablet   TAKE 1 TABLET BY MOUTH EVERY DAY, Disp: , Rfl:     mycophenolate (MYFORTIC) 360 MG tablet delayed-release EC tablet, Take 2 tablets by mouth 2 (two) times a day., Disp: , Rfl:     ondansetron (ZOFRAN) 4 MG tablet, Take 1 tablet by mouth Every 8 (Eight) Hours As Needed for Nausea or Vomiting., Disp: 30 tablet, Rfl: 0    pantoprazole (PROTONIX) 40 MG EC tablet, Take 1 tablet by mouth Daily., Disp: , Rfl:     potassium chloride (KLOR-CON M20) 20 MEQ CR tablet, Take 1 tablet by mouth Daily., Disp: , Rfl:     Tacrolimus ER (Envarsus XR) 1 MG tablet sustained-release 24 hour, Take 1 tablet by mouth 2 (two) times a day., Disp: , Rfl:     Current Facility-Administered Medications:     triamcinolone acetonide (KENALOG-40) injection 40 mg, 40 mg, Intramuscular, Once, Chrystal Salazar, APRN   Past Medical History:   Diagnosis Date    Acute sinusitis     Alcohol abuse 12/2015    HOSPITALIZED - ADMITTED ONCE 12/15    Alcoholic cirrhosis of liver without ascites     Anxiety disorder, unspecified     Bariatric surgery status     Cataract     Had surgery on left eye 6/19/2024    Cholelithiasis     Gallbladder removed 1993    Chronic kidney disease, stage 4 (severe)     Left upper quadrant pain     Liver cancer 06/01/2020    Liver cell carcinoma     Liver transplant status     Low back pain     Lumbago with sciatica, right side     Melena     DENNIS (nonalcoholic steatohepatitis)     Other long term (current) drug therapy     Other microscopic hematuria     Other specified depressive episodes     Pain in unspecified joint     Tuberculosis     HOSPITALIZED X3; WAS 4 YEARS OLD    Zoster without complications      Allergies   Allergen Reactions    Morphine Nausea And Vomiting    Propofol Nausea And Vomiting               Result Review :     Common labs          11/26/2024    10:37 11/26/2024    10:55 4/1/2025    17:07   Common Labs   Glucose  NEGATIVE     69    BUN   16    Creatinine   1.09    Sodium   141    Potassium   5.0    Chloride    109    Calcium   9.0    Albumin   3.5    Total Bilirubin   0.3    Alkaline Phosphatase   136    AST (SGOT)   44    ALT (SGPT)   36    WBC 4.9     NONE SEEN     11.16    Hemoglobin 10.5      11.9    Hematocrit 32.5      37.4    Platelets 437      489       Details          This result is from an external source.                No Images in the past 120 days found..           Social History     Tobacco Use   Smoking Status Never    Passive exposure: Never   Smokeless Tobacco Never           Assessment and Plan    Diagnoses and all orders for this visit:    1. Acute gout of right foot, unspecified cause (Primary)  -     triamcinolone acetonide (KENALOG-40) injection 40 mg  -     Uric acid; Future        Follow Up    No follow-ups on file.  Patient was given instructions and counseling regarding her condition or for health maintenance advice. Please see specific information pulled into the AVS if appropriate.

## 2025-04-04 NOTE — ASSESSMENT & PLAN NOTE
Patient has had a liver transplant and cannot take NSAIDs.  Prednisone and Tylenol have not been effective.  I would like to clear potential use of allopurinol with her liver transplant team.  Patient recommends that I call Nadege or Easton with [facility] transplant [place].  I initially called [phone] and they were closed on the day of April 1.  I called on April 2 and left a message for someone to return my call at 12:55 PM.  I left my personal cell number instead of office number hoping to ease response time.  Transplant center called back and left me a message that they cannot leave a message on my cell because I have not identified myself.  I was advised to then call [phone] option for.  I called and left messages requesting to call back on April 3 at 3 PM and April 4 at 12:27 PM.

## 2025-04-08 PROBLEM — M10.9 ACUTE GOUT OF RIGHT FOOT: Status: ACTIVE | Noted: 2025-04-08

## 2025-04-08 NOTE — ASSESSMENT & PLAN NOTE
Left foot has improved but right foot has worsened.  I sent in allopurinol 100 mg daily earlier today.  Recommend rechecking a uric acid level in 3 months.  Dosage of allopurinol may need to be increased.  Due to severity of patient's symptoms we will provide a triamcinolone injection today intramuscularly for potential anti-inflammatory benefit.  Follow-up if not improving.

## 2025-04-11 DIAGNOSIS — F32.A ANXIETY AND DEPRESSION: ICD-10-CM

## 2025-04-11 DIAGNOSIS — F41.9 ANXIETY AND DEPRESSION: ICD-10-CM

## 2025-04-14 RX ORDER — HYDROXYZINE HYDROCHLORIDE 25 MG/1
25 TABLET, FILM COATED ORAL 3 TIMES DAILY PRN
Qty: 90 TABLET | Refills: 0 | Status: SHIPPED | OUTPATIENT
Start: 2025-04-14

## 2025-05-13 DIAGNOSIS — M79.672 LEFT FOOT PAIN: ICD-10-CM

## 2025-05-13 DIAGNOSIS — M79.671 RIGHT FOOT PAIN: Primary | ICD-10-CM

## 2025-05-14 ENCOUNTER — HOSPITAL ENCOUNTER (OUTPATIENT)
Dept: GENERAL RADIOLOGY | Facility: HOSPITAL | Age: 60
Discharge: HOME | End: 2025-05-14
Admitting: PODIATRIST
Payer: COMMERCIAL

## 2025-05-14 PROCEDURE — 73630 X-RAY EXAM OF FOOT: CPT

## 2025-05-15 ENCOUNTER — OFFICE VISIT (OUTPATIENT)
Dept: PODIATRY | Facility: CLINIC | Age: 60
End: 2025-05-15
Payer: COMMERCIAL

## 2025-05-15 VITALS
DIASTOLIC BLOOD PRESSURE: 73 MMHG | SYSTOLIC BLOOD PRESSURE: 134 MMHG | OXYGEN SATURATION: 98 % | BODY MASS INDEX: 29.82 KG/M2 | HEART RATE: 99 BPM | HEIGHT: 65 IN | WEIGHT: 179 LBS

## 2025-05-15 DIAGNOSIS — I87.2 VENOUS INSUFFICIENCY: ICD-10-CM

## 2025-05-15 DIAGNOSIS — M10.00 IDIOPATHIC GOUT, UNSPECIFIED CHRONICITY, UNSPECIFIED SITE: ICD-10-CM

## 2025-05-15 DIAGNOSIS — M62.462 GASTROCNEMIUS EQUINUS OF BOTH LOWER EXTREMITIES: ICD-10-CM

## 2025-05-15 DIAGNOSIS — G62.9 NEUROPATHY: Primary | ICD-10-CM

## 2025-05-15 DIAGNOSIS — M62.461 GASTROCNEMIUS EQUINUS OF BOTH LOWER EXTREMITIES: ICD-10-CM

## 2025-05-15 RX ORDER — BRIMONIDINE TARTRATE 2 MG/ML
1 SOLUTION/ DROPS OPHTHALMIC 2 TIMES DAILY
COMMUNITY
Start: 2025-04-22

## 2025-05-15 NOTE — PROGRESS NOTES
[facility] - PODIATRY    Today's Date: 05/15/25    Patient Name: Verna Gallo  MRN: [id number]  CSN: [id number]  PCP: Chrystal Salazar APRN,  Referring Provider: Referring, Self    SUBJECTIVE     Chief Complaint   Patient presents with    Left Foot - Establish Care     Bilateral plantar fasciitis, Arthritis    Right Foot - Establish Care     Bilateral plantar fasciitis, Arthritis     HPI: Verna Gallo, a 59 y.o.female, presents to clinic.    History of Present Illness  The patient presents for bilateral foot pain.    She reports intermittent bilateral foot pain, with the left foot being more affected than the right. The left toe has been swollen, although the swelling has slightly subsided. She experiences throbbing and severe pain in this area. The right foot has shown some improvement. She recalls undergoing an MRI approximately 3 years ago due to similar issues. She was diagnosed with gout, attributed to a uric acid level of 9.2, and is currently on medication for this condition. She is unable to take ibuprofen and has been using Voltaren gel and over-the-counter medicated wraps, which have not provided significant relief. She also uses a boot on particularly painful days. She received an injection from Dr. Chrystal Salazar and was prescribed allopurinol. Another physician suggested surgery due to bone-on-bone contact in her heel, but she declined. She experiences difficulty walking normally and increased pain towards the end of the day after prolonged standing.     She has osteoporosis in her back, as diagnosed by a rheumatologist, but does not recall having a DEXA scan. She had a liver transplant in 2017 and is on drugs for this    She has chronic swelling in her legs and has tried compression socks without success.             Past Medical History:   Diagnosis Date    Acute sinusitis     Alcohol abuse 12/2015    HOSPITALIZED - ADMITTED ONCE 12/15    Alcoholic cirrhosis of liver without  ascites     Anxiety disorder, unspecified     Bariatric surgery status     Cataract     Had surgery on left eye 2024    Cholelithiasis     Gallbladder removed     Chronic kidney disease, stage 4 (severe)     Difficulty walking 2025    My ankles do not flex much when i walk. I usually am limping.    Gout 2025    Still is flaring up    History of transfusion     Have had 3 times    Left upper quadrant pain     Liver cancer 2020    Liver cell carcinoma     Liver transplant status     Low back pain     Lumbago with sciatica, right side     Melena     DENNIS (nonalcoholic steatohepatitis)     Other long term (current) drug therapy     Other microscopic hematuria     Other specified depressive episodes     Pain in unspecified joint     Tuberculosis     HOSPITALIZED X3; WAS 4 YEARS OLD    Zoster without complications      Past Surgical History:   Procedure Laterality Date     SECTION      X1    CHOLECYSTECTOMY      GASTRIC BYPASS      RUENY    HYSTERECTOMY      ENDOMETRIOSIS; HAS RIGHT TUBE AND OVARY    INCISIONAL HERNIA REPAIR      LIVER TRANSPLANTATION  2017    POLYPECTOMY      X5    PROSTHODONTIC PROCEDURE      FULL MOUTH DENTURES AFTER ALL TEETH EXTRACTED    TUBAL ABDOMINAL LIGATION       Family History   Problem Relation Age of Onset    Heart attack Mother     Liver disease Father         Never drank in his life    Coronary artery disease Father     Coronary artery disease Sister     Arrhythmia Brother     Coronary artery disease Brother     Heart attack Brother     Lung cancer Brother     Stroke Brother     Pneumonia Maternal Grandmother     Cancer Paternal Grandfather         CANCER OF PHARNYX     Social History     Socioeconomic History    Marital status:     Number of children: 1   Tobacco Use    Smoking status: Never     Passive exposure: Never    Smokeless tobacco: Never   Vaping Use    Vaping status: Never Used   Substance and Sexual Activity    Alcohol use: Not  Currently     Comment: PATIENT HAS PAST HISTORY OF ALOCHOLISM; LAST DRINK WAS 11/30/15; CURRENT MEMBER OF [employer]    Drug use: Never    Sexual activity: Yes     Partners: Male     Birth control/protection: Hysterectomy     Comment: Still have right ovary     Allergies   Allergen Reactions    Morphine Nausea And Vomiting    Propofol Nausea And Vomiting     Current Outpatient Medications   Medication Sig Dispense Refill    allopurinol (Zyloprim) 100 MG tablet Take 1 tablet by mouth Daily. 30 tablet 5    aspirin 81 MG EC tablet Take 1 tablet by mouth Daily.      brimonidine (ALPHAGAN) 0.2 % ophthalmic solution Administer 1 drop into the left eye 2 (Two) Times a Day.      cyanocobalamin 1000 MCG/ML injection ADMINISTER 1 ML IN THE MUSCLE WEEKLY      Diclofenac Sodium (VOLTAREN) 1 % gel gel Apply 4 g topically to the appropriate area as directed 2 (Two) Times a Day.      dilTIAZem (CARDIZEM) 60 MG tablet Take 1 tablet by mouth Daily.      ferrous gluconate (FERGON) 240 (27 FE) MG tablet Take 1 tablet by mouth Daily.      folic acid (FOLVITE) 1 MG tablet Take 1 tablet by mouth Daily. 90 tablet 0    furosemide (LASIX) 20 MG tablet Take 1 tablet by mouth Daily.      hydrOXYzine (ATARAX) 25 MG tablet Take 1 tablet by mouth 3 (Three) Times a Day As Needed for Itching or Anxiety. 90 tablet 0    magnesium oxide (MAG-OX) 400 MG tablet magnesium oxide 400 mg (241.3 mg magnesium) tablet   TAKE 1 TABLET BY MOUTH EVERY DAY      mycophenolate (MYFORTIC) 360 MG tablet delayed-release EC tablet Take 2 tablets by mouth 2 (two) times a day.      ondansetron (ZOFRAN) 4 MG tablet Take 1 tablet by mouth Every 8 (Eight) Hours As Needed for Nausea or Vomiting. 30 tablet 0    pantoprazole (PROTONIX) 40 MG EC tablet Take 1 tablet by mouth Daily.      potassium chloride (KLOR-CON M20) 20 MEQ CR tablet Take 1 tablet by mouth Daily.      Tacrolimus ER (Envarsus XR) 1 MG tablet sustained-release 24 hour Take 1 tablet by mouth 2 (two) times a day.        No current facility-administered medications for this visit.         OBJECTIVE     Vitals:    05/15/25 0810   BP: 134/73   Pulse: 99   SpO2: 98%       WBC   Date Value Ref Range Status   04/01/2025 11.16 (H) 3.40 - 10.80 10*3/mm3 Final   11/26/2024 4.9 3.8 - 10.8 K/uL Final     RBC   Date Value Ref Range Status   04/01/2025 3.99 3.77 - 5.28 10*6/mm3 Final   11/26/2024 3.6 (L) 3.8 - 5.1 x10(6)/uL Final     Hemoglobin   Date Value Ref Range Status   04/01/2025 11.9 (L) 12.0 - 15.9 g/dL Final   11/26/2024 10.5 (L) 11.5 - 15.5 Gram/dL Final     Hematocrit   Date Value Ref Range Status   04/01/2025 37.4 34.0 - 46.6 % Final   11/26/2024 32.5 (L) 35.0 - 45.0 % Final     MCV   Date Value Ref Range Status   04/01/2025 93.7 79.0 - 97.0 fL Final   11/26/2024 90 80.0 - 100.0 fL Final     MCH   Date Value Ref Range Status   04/01/2025 29.8 26.6 - 33.0 pg Final   11/26/2024 29.2 27.0 - 33.0 pg Final     MCHC   Date Value Ref Range Status   04/01/2025 31.8 31.5 - 35.7 g/dL Final   11/26/2024 32.5 32.0 - 36.0 Gram/dL Final     RDW   Date Value Ref Range Status   04/01/2025 13.4 12.3 - 15.4 % Final   11/26/2024 18.4 (H) 11.0 - 15.0 % Final     RDW-SD   Date Value Ref Range Status   04/01/2025 45.8 37.0 - 54.0 fl Final     MPV   Date Value Ref Range Status   04/01/2025 9.3 6.0 - 12.0 fL Final   11/26/2024 6.8 (L) 7.5 - 11.5 fL Final     Platelets   Date Value Ref Range Status   04/01/2025 489 (H) 140 - 450 10*3/mm3 Final     External Platelets   Date Value Ref Range Status   11/26/2024 437 (H) 140 - 400 x10(3)/uL Final     Neutrophil %   Date Value Ref Range Status   04/01/2025 63.5 42.7 - 76.0 % Final     Lymphocyte %   Date Value Ref Range Status   04/01/2025 25.1 19.6 - 45.3 % Final   11/26/2024 33.3 % Final     Monocyte %   Date Value Ref Range Status   04/01/2025 8.6 5.0 - 12.0 % Final   11/26/2024 6.3 % Final     Eosinophil %   Date Value Ref Range Status   04/01/2025 1.3 0.3 - 6.2 % Final   11/26/2024 3.2 % Final      Basophil %   Date Value Ref Range Status   04/01/2025 1.1 0.0 - 1.5 % Final   11/26/2024 1.6 % Final     Immature Grans %   Date Value Ref Range Status   04/01/2025 0.4 0.0 - 0.5 % Final     Neutrophils Absolute   Date Value Ref Range Status   07/06/2022 4.00 1.70 - 6.00 x10(3)/ul Final     Neutrophils, Absolute   Date Value Ref Range Status   04/01/2025 7.10 (H) 1.70 - 7.00 10*3/mm3 Final   11/26/2024 2.7 1.5 - 7.8 x10(3)/uL Final     Lymphocytes, Absolute   Date Value Ref Range Status   04/01/2025 2.80 0.70 - 3.10 10*3/mm3 Final     Monocytes, Absolute   Date Value Ref Range Status   04/01/2025 0.96 (H) 0.10 - 0.90 10*3/mm3 Final   11/26/2024 0.3 0.2 - 1.0 x10(3)/uL Final     Eosinophils Absolute   Date Value Ref Range Status   07/06/2022 0.1 0.0 - 0.6 x10(3)/ul Final     Eosinophils, Absolute   Date Value Ref Range Status   04/01/2025 0.14 0.00 - 0.40 10*3/mm3 Final   11/26/2024 0.2 0.2 - 0.5 x10(3)/uL Final     Basophils Absolute   Date Value Ref Range Status   07/06/2022 0.1 0.0 - 0.3 x10(3)/ul Final     External Basophil Abs   Date Value Ref Range Status   11/26/2024 0.1 0.0 - 0.2 x10(3)/uL Final     Basophils, Absolute   Date Value Ref Range Status   04/01/2025 0.12 0.00 - 0.20 10*3/mm3 Final     Immature Grans, Absolute   Date Value Ref Range Status   04/01/2025 0.04 0.00 - 0.05 10*3/mm3 Final     nRBC   Date Value Ref Range Status   04/01/2025 0.0 0.0 - 0.2 /100 WBC Final         Lab Results   Component Value Date    GLUCOSE 69 04/01/2025    BUN 16 04/01/2025    CREATININE 1.09 (H) 04/01/2025    BCR 14.7 04/01/2025    K 5.0 04/01/2025    CO2 21.1 (L) 04/01/2025    CALCIUM 9.0 04/01/2025    ALBUMIN 3.5 04/01/2025    AST 44 (H) 04/01/2025    ALT 36 (H) 04/01/2025       Patient seen in no apparent distress.      PHYSICAL EXAM:     Foot/Ankle Exam    GENERAL  Appearance:  appears stated age  Orientation:  AAOx3  Affect:  appropriate  Gait:  unimpaired  Assistance:  independent  Right shoe gear: casual  shoe  Left shoe gear: casual shoe    VASCULAR     Right Foot Vascularity   Dorsalis pedis:  2+  Posterior tibial:  2+  Skin temperature:  warm  Edema gradin+, non-pitting and pitting  CFT:  < 3 seconds  Pedal hair growth:  Present  Varicosities:  none     Left Foot Vascularity   Dorsalis pedis:  2+  Posterior tibial:  2+  Skin temperature:  warm  Edema gradin+ and pitting  CFT:  < 3 seconds  Pedal hair growth:  Present  Varicosities:  none     NEUROLOGIC     Right Foot Neurologic   Normal sensation    Light touch sensation: normal  Vibratory sensation: normal  Hot/Cold sensation: normal  Protective Sensation using [employer] [employer] Monofilament:   Sites intact: 10  Sites tested: 10     Left Foot Neurologic   Normal sensation    Light touch sensation: normal  Vibratory sensation: normal  Hot/Cold sensation:  normal  Protective Sensation using [place] [employer] Monofilament:   Sites intact: 10  Sites tested: 10    MUSCULOSKELETAL     Right Foot Musculoskeletal   Tenderness:  tarsometatarsal joints tenderness, toe 1 tenderness, toe 2 tenderness, toe 3 tenderness, toe 4 tenderness and toe 5 tenderness       Left Foot Musculoskeletal   Tenderness:  tarsometatarsal joints tenderness, toe 1 tenderness, toe 2 tenderness, toe 3 tenderness, toe 4 tenderness and toe 5 tenderness    MUSCLE STRENGTH     Right Foot Muscle Strength   Foot dorsiflexion:  4  Foot plantar flexion:  4  Foot inversion:  4  Foot eversion:  4     Left Foot Muscle Strength   Foot dorsiflexion:  4  Foot plantar flexion:  4  Foot inversion:  4  Foot eversion:  4    RANGE OF MOTION     Right Foot Range of Motion   Foot and ankle ROM within normal limits    Ankle dorsiflexion: decreased      Left Foot Range of Motion   Foot and ankle ROM within normal limits    Ankle dorsiflexion: decreased    DERMATOLOGIC      Right Foot Dermatologic   Skin  Right foot skin is intact.      Left Foot Dermatologic   Skin  Left foot skin is intact.       RADIOLOGY:         XR Foot 3+ View Left  Result Date: 5/14/2025  Narrative: XR FOOT 3+ VW RIGHT, XR FOOT 3+ VW LEFT Date of Exam: 5/14/2025 5:07 PM EDT Indication: Pain Comparison: None available. Findings: Feet: Negative for acute displaced fracture or joint malalignment. Negative for marginal erosions, chondrocalcinosis or periarticular osteopenia. Soft tissues radiographically unremarkable. Generalized osseous demineralization limiting detection for fractures. Mild symmetric degenerative osteoarthritis of the talonavicular joint. Probable mild degenerative osteoarthritis of the ankles and posterior subtalar joints. Small bilateral plantar spurs.     Impression: Impression: 1. Generalized demineralization limiting detection for fractures. Consider nonemergent DEXA correlation. 2. Degenerative osteoarthritic change, without findings to suggest crystalline or inflammatory arthropathy. 3. Small bilateral plantar spurs. Electronically Signed: Chandrakant Bacon MD  5/14/2025 6:07 PM EDT  Workstation ID: [id number]    XR Foot 3+ View Right  Result Date: 5/14/2025  Narrative: XR FOOT 3+ VW RIGHT, XR FOOT 3+ VW LEFT Date of Exam: 5/14/2025 5:07 PM EDT Indication: Pain Comparison: None available. Findings: Feet: Negative for acute displaced fracture or joint malalignment. Negative for marginal erosions, chondrocalcinosis or periarticular osteopenia. Soft tissues radiographically unremarkable. Generalized osseous demineralization limiting detection for fractures. Mild symmetric degenerative osteoarthritis of the talonavicular joint. Probable mild degenerative osteoarthritis of the ankles and posterior subtalar joints. Small bilateral plantar spurs.     Impression: Impression: 1. Generalized demineralization limiting detection for fractures. Consider nonemergent DEXA correlation. 2. Degenerative osteoarthritic change, without findings to suggest crystalline or inflammatory arthropathy. 3. Small bilateral plantar spurs. Electronically Signed:  Chandrakant Bacon MD  5/14/2025 6:07 PM EDT  Workstation ID: [id number]      ASSESSMENT/PLAN     Diagnoses and all orders for this visit:    1. Neuropathy (Primary)    2. Idiopathic gout, unspecified chronicity, unspecified site    3. Venous insufficiency    4. Gastrocnemius equinus of both lower extremities        Comprehensive lower extremity examination and evaluation was performed.    Assessment & Plan    Patient's pain is likely multifactorial secondary to arthritis, chronic swelling, neuropathy, and osteoporosis.  Not a surgical candidate.    Discussed proper shoegear for the patient's feet and medical condition.  Patient states understanding and agreement with this plan.    Recommend physical therapy    Rx:  Topical, compounded, neuropathy medication was written; see attached prescription.    Recommend compression stockings bilaterally.    Discussed findings and treatment plan including risks, benefits, and treatment options with patient in detail. Patient agreed with treatment plan.    Medications and allergies reviewed.  Reviewed available lab values along with other pertinent labs.  These were discussed with the patient.    All questions were answered to patient/family satisfaction. Should symptoms fail to improve or worsen they agree to call or return to clinic or to go to the Emergency Department. Discussed the importance of following up with any needed screening tests/labs/specialist appointments and any requested follow-up recommended by me today. Importance of maintaining follow-up discussed and patient accepts that missed appointments can delay diagnosis and potentially lead to worsening of conditions.    No follow-ups on file., or sooner if acute issues arise.    Patient or patient representative verbalized consent for the use of Ambient Listening during the visit with  Eros Jacobs DPM for chart documentation. 5/15/2025  08:54 EDT    This document has been electronically signed by Eros ADAMS  GIRISH Jacobs on May 15, 2025 08:55 EDT

## 2025-05-16 ENCOUNTER — OFFICE VISIT (OUTPATIENT)
Dept: FAMILY MEDICINE CLINIC | Age: 60
End: 2025-05-16
Payer: COMMERCIAL

## 2025-05-16 VITALS
SYSTOLIC BLOOD PRESSURE: 172 MMHG | WEIGHT: 180 LBS | TEMPERATURE: 98.4 F | HEIGHT: 65 IN | DIASTOLIC BLOOD PRESSURE: 89 MMHG | BODY MASS INDEX: 29.99 KG/M2 | HEART RATE: 87 BPM | OXYGEN SATURATION: 100 %

## 2025-05-16 DIAGNOSIS — J00 ACUTE RHINITIS: ICD-10-CM

## 2025-05-16 DIAGNOSIS — R05.1 ACUTE COUGH: ICD-10-CM

## 2025-05-16 DIAGNOSIS — R03.0 ELEVATED BLOOD PRESSURE READING: ICD-10-CM

## 2025-05-16 DIAGNOSIS — J40 BRONCHITIS: Primary | ICD-10-CM

## 2025-05-16 RX ORDER — BENZONATATE 100 MG/1
100 CAPSULE ORAL 3 TIMES DAILY PRN
Qty: 30 CAPSULE | Refills: 0 | Status: SHIPPED | OUTPATIENT
Start: 2025-05-16 | End: 2025-05-23 | Stop reason: ALTCHOICE

## 2025-05-16 RX ORDER — FLUTICASONE PROPIONATE 50 MCG
1 SPRAY, SUSPENSION (ML) NASAL 2 TIMES DAILY
Qty: 16 G | Refills: 0 | Status: SHIPPED | OUTPATIENT
Start: 2025-05-16

## 2025-05-16 RX ORDER — METHYLPREDNISOLONE 4 MG/1
TABLET ORAL
Qty: 21 TABLET | Refills: 0 | Status: SHIPPED | OUTPATIENT
Start: 2025-05-16 | End: 2025-05-23

## 2025-05-16 NOTE — PROGRESS NOTES
Chief Complaint     Cough (Dry cough X 1 week /She's tried OTC tylenol severe, & OTC cold & flu for Sx, these provided no relief. ), Generalized Body Aches (Mild body aches X 1 week ), Hypertension (X 1 week ), and Diarrhea (Diarrhea Tuesday & Wednesday, this has now resolved. )    History of Present Illness     Verna Gallo is a 60 y.o. female who presents to [facility].     Patient or patient representative verbalized consent for the use of Ambient Listening during the visit with  TONY Tovar for chart documentation. 5/19/2025  16:01 EDT      History of Present Illness  The patient is a 59-year-old female who presents for evaluation of a cough.    She reports a severe, non-productive cough that has been present since the previous weekend. Initially, she suspected it to be allergy-related due to the absence of drainage. The cough is localized in her chest and is particularly disruptive at night, causing her to wake up gasping for air after a few hours of sleep. She also reports nasal congestion, sinus pressure, headaches, and body aches. She does not have a sore throat but describes a general discomfort. She experienced diarrhea on Tuesday and Wednesday. Despite taking over-the-counter Tylenol Cold and Flu and Tylenol Severe Allergy, as well as using cough drops, her symptoms have not improved. She has previously taken benzonatate but does not recall its efficacy. She has a history of tuberculosis in childhood, which has resulted in lung scarring. She also has a history of bronchitis.    Blood pressure elevated today at 172/89, will check prior to leaving the office.  Her blood pressure was recorded as 137/79 during a visit to the podiatrist yesterday. She continues to take diltiazem daily and Lasix on weekends, although she has not taken it this week. She monitors her blood pressure at home.         History      Past Medical History:   Diagnosis Date    Acute sinusitis      Alcohol abuse 2015    HOSPITALIZED - ADMITTED ONCE 12/15    Alcoholic cirrhosis of liver without ascites     Anxiety disorder, unspecified     Bariatric surgery status     Cataract     Had surgery on left eye 2024    Cholelithiasis     Gallbladder removed     Chronic kidney disease, stage 4 (severe)     Difficulty walking 2025    My ankles do not flex much when i walk. I usually am limping.    Gout 2025    Still is flaring up    History of transfusion     Have had 3 times    Left upper quadrant pain     Liver cancer 2020    Liver cell carcinoma     Liver transplant status     Low back pain     Lumbago with sciatica, right side     Melena     DENNIS (nonalcoholic steatohepatitis)     Other long term (current) drug therapy     Other microscopic hematuria     Other specified depressive episodes     Pain in unspecified joint     Tuberculosis     HOSPITALIZED X3; WAS 4 YEARS OLD    Zoster without complications        Past Surgical History:   Procedure Laterality Date     SECTION      X1    CHOLECYSTECTOMY      GASTRIC BYPASS      RUENY    HYSTERECTOMY      ENDOMETRIOSIS; HAS RIGHT TUBE AND OVARY    INCISIONAL HERNIA REPAIR  2004    LIVER TRANSPLANTATION  2017    POLYPECTOMY      X5    PROSTHODONTIC PROCEDURE      FULL MOUTH DENTURES AFTER ALL TEETH EXTRACTED    TUBAL ABDOMINAL LIGATION         Family History   Problem Relation Age of Onset    Heart attack Mother     Liver disease Father         Never drank in his life    Coronary artery disease Father     Coronary artery disease Sister     Arrhythmia Brother     Coronary artery disease Brother     Heart attack Brother     Lung cancer Brother     Stroke Brother     Pneumonia Maternal Grandmother     Cancer Paternal Grandfather         CANCER OF PHARNYX        Current Medications        Current Outpatient Medications:     aspirin 81 MG EC tablet, Take 1 tablet by mouth Daily., Disp: , Rfl:     brimonidine (ALPHAGAN) 0.2 % ophthalmic  solution, Administer 1 drop into the left eye 2 (Two) Times a Day., Disp: , Rfl:     cyanocobalamin 1000 MCG/ML injection, ADMINISTER 1 ML IN THE MUSCLE WEEKLY, Disp: , Rfl:     Diclofenac Sodium (VOLTAREN) 1 % gel gel, Apply 4 g topically to the appropriate area as directed 2 (Two) Times a Day., Disp: , Rfl:     dilTIAZem (CARDIZEM) 60 MG tablet, Take 1 tablet by mouth Daily., Disp: , Rfl:     ferrous gluconate (FERGON) 240 (27 FE) MG tablet, Take 1 tablet by mouth Daily., Disp: , Rfl:     folic acid (FOLVITE) 1 MG tablet, Take 1 tablet by mouth Daily., Disp: 90 tablet, Rfl: 0    furosemide (LASIX) 20 MG tablet, Take 1 tablet by mouth Daily., Disp: , Rfl:     hydrOXYzine (ATARAX) 25 MG tablet, Take 1 tablet by mouth 3 (Three) Times a Day As Needed for Itching or Anxiety., Disp: 90 tablet, Rfl: 0    magnesium oxide (MAG-OX) 400 MG tablet, magnesium oxide 400 mg (241.3 mg magnesium) tablet  TAKE 1 TABLET BY MOUTH EVERY DAY, Disp: , Rfl:     mycophenolate (MYFORTIC) 360 MG tablet delayed-release EC tablet, Take 2 tablets by mouth 2 (two) times a day., Disp: , Rfl:     ondansetron (ZOFRAN) 4 MG tablet, Take 1 tablet by mouth Every 8 (Eight) Hours As Needed for Nausea or Vomiting., Disp: 30 tablet, Rfl: 0    pantoprazole (PROTONIX) 40 MG EC tablet, Take 1 tablet by mouth Daily., Disp: , Rfl:     potassium chloride (KLOR-CON M20) 20 MEQ CR tablet, Take 1 tablet by mouth Daily., Disp: , Rfl:     Tacrolimus ER (Envarsus XR) 1 MG tablet sustained-release 24 hour, Take 1 tablet by mouth 2 (two) times a day., Disp: , Rfl:     allopurinol (Zyloprim) 100 MG tablet, Take 1 tablet by mouth Daily. (Patient not taking: Reported on 5/16/2025), Disp: 30 tablet, Rfl: 5    benzonatate (Tessalon Perles) 100 MG capsule, Take 1 capsule by mouth 3 (Three) Times a Day As Needed for Cough., Disp: 30 capsule, Rfl: 0    brompheniramine-pseudoephedrine-DM 30-2-10 MG/5ML syrup, Take 5 mL by mouth 4 (Four) Times a Day As Needed for Congestion or  "Cough., Disp: 236 mL, Rfl: 0    fluticasone (FLONASE) 50 MCG/ACT nasal spray, Administer 1 spray into the nostril(s) as directed by provider 2 (Two) Times a Day., Disp: 16 g, Rfl: 0    Ibuprofen 3 %, Gabapentin 10 %, Baclofen 2 %, lidocaine 4 %, Ketamine HCl 4 %, Apply 1-2 g topically to the appropriate area as directed 3 (Three) to 4 (Four) times daily. (Patient not taking: Reported on 5/16/2025), Disp: 90 g, Rfl: 2    methylPREDNISolone (MEDROL) 4 MG dose pack, Take as directed on package instructions., Disp: 21 tablet, Rfl: 0     Allergies     Allergies   Allergen Reactions    Morphine Nausea And Vomiting    Propofol Nausea And Vomiting       Social History       Social History     Social History Narrative    Not on file       Immunizations     Immunization:  Immunization History   Administered Date(s) Administered    COVID-19 (MODERNA) 12YRS+ (SPIKEVAX) 10/25/2024    COVID-19 (MODERNA) 1st,2nd,3rd Dose Monovalent 03/06/2021, 04/01/2021, 09/03/2021    COVID-19 (UNSPECIFIED) 03/06/2021, 04/01/2021    Flu Vaccine Quad PF >36MO 11/07/2020, 10/23/2021    Fluzone  >6mos 10/25/2024    Fluzone (or Fluarix & Flulaval for VFC) >6mos 11/07/2020, 10/23/2021    Fluzone High-Dose 65+YRS 09/30/2018    Hep A, Unspecified 03/29/2013, 09/23/2013    Influenza TIV (IM) 12/07/2012    Influenza, Unspecified 09/23/2013, 12/08/2014, 10/13/2015, 10/01/2020    Pneumococcal Conjugate 13-Valent (PCV13) 10/13/2015, 11/28/2017    Pneumococcal Conjugate 20-Valent (PCV20) 10/22/2024    Pneumococcal Polysaccharide (PPSV23) 09/30/2018, 02/19/2020    Shingrix 08/13/2021, 10/23/2021          Objective     Objective     Vital Signs:   /89 (BP Location: Left arm, Patient Position: Sitting, Cuff Size: Adult)   Pulse 87   Temp 98.4 °F (36.9 °C) (Oral)   Ht 165.1 cm (65\")   Wt 81.6 kg (180 lb)   SpO2 100%   BMI 29.95 kg/m²       Physical Exam  Vitals and nursing note reviewed.   Constitutional:       Appearance: Normal appearance. She is " overweight.   HENT:      Head: Normocephalic.      Right Ear: Tympanic membrane, ear canal and external ear normal.      Left Ear: Tympanic membrane, ear canal and external ear normal.      Nose: Rhinorrhea present.      Mouth/Throat:      Lips: Pink.      Mouth: Mucous membranes are moist.      Pharynx: Oropharynx is clear. Uvula midline.   Eyes:      Conjunctiva/sclera: Conjunctivae normal.      Pupils: Pupils are equal, round, and reactive to light.   Cardiovascular:      Rate and Rhythm: Normal rate and regular rhythm.      Pulses: Normal pulses.      Heart sounds: Normal heart sounds.   Pulmonary:      Effort: Pulmonary effort is normal.      Breath sounds: Normal breath sounds.      Comments: Dry cough present  Abdominal:      General: Bowel sounds are normal.      Palpations: Abdomen is soft.   Musculoskeletal:         General: Normal range of motion.      Cervical back: Normal range of motion and neck supple.   Lymphadenopathy:      Cervical: Cervical adenopathy present.      Right cervical: Superficial cervical adenopathy present.      Left cervical: Superficial cervical adenopathy present.   Skin:     General: Skin is warm and dry.   Neurological:      General: No focal deficit present.      Mental Status: She is alert and oriented to person, place, and time.   Psychiatric:         Attention and Perception: Attention normal.         Mood and Affect: Mood and affect normal.         Behavior: Behavior normal. Behavior is cooperative.         Physical Exam  Ears: External ear canals and tympanic membranes intact  Mouth/Throat: Mucous membranes moist, no erythema, no exudate  Neck: Supple, no abnormalities  Respiratory: Clear to auscultation, no wheezing, rales or rhonchi      Results    The following data was reviewed by: TONY Tovar on 05/19/25             POCT SARS-CoV-2 Antigen JASWINDER + Flu (05/16/2025 15:58)   Results  Labs   - COVID-19 test: Negative   - Influenza test: Negative         Assessment  and Plan        Assessment and Plan       Bronchitis         Acute cough    Orders:    POCT SARS-CoV-2 Antigen JASWINDER + Flu    benzonatate (Tessalon Perles) 100 MG capsule; Take 1 capsule by mouth 3 (Three) Times a Day As Needed for Cough.    methylPREDNISolone (MEDROL) 4 MG dose pack; Take as directed on package instructions.  Discussed the use of cough drops, Chloraseptic spray/lozenges, Vicks VapoRub, hot tea with honey, humidifier. Educated on adequate water intake.  Acute rhinitis    Orders:    fluticasone (FLONASE) 50 MCG/ACT nasal spray; Administer 1 spray into the nostril(s) as directed by provider 2 (Two) Times a Day.    Elevated blood pressure reading  Blood pressure elevated today at 172/89, will check prior to leaving the office.             Assessment & Plan  1. Cough.  - COVID-19 and influenza tests returned negative results.  - Physical examination revealed no abnormal findings in the ears, throat, or lungs.  - Benzonatate prescribed for cough management; Flonase and Medrol Dosepak prescribed to reduce inflammation.  - Advised to continue using cough drops, Chloraseptic spray and lozenges, Vicks VapoRub, hot tea with honey, and a humidifier. If benzonatate is ineffective, patient should send a [employer] message for alternative medication.    2. Blood pressure management.  - Blood pressure recorded at 137/79 during a recent podiatrist visit.  - Patient continues to take diltiazem daily and Lasix on weekends.  - Blood pressure will be rechecked before leaving; patient encouraged to monitor blood pressure at home and maintain a log for review during next visit with Chrystal on 10/03/2025.        Follow Up        Follow Up   Return for With PCP, Next scheduled follow up, sooner if condition worsens.  Patient was given instructions and counseling regarding her condition or for health maintenance advice. Please see specific information pulled into the AVS if appropriate.

## 2025-05-19 RX ORDER — BROMPHENIRAMINE MALEATE, PSEUDOEPHEDRINE HYDROCHLORIDE, AND DEXTROMETHORPHAN HYDROBROMIDE 2; 30; 10 MG/5ML; MG/5ML; MG/5ML
5 SYRUP ORAL 4 TIMES DAILY PRN
Qty: 236 ML | Refills: 0 | Status: SHIPPED | OUTPATIENT
Start: 2025-05-19 | End: 2025-05-23 | Stop reason: ALTCHOICE

## 2025-05-23 ENCOUNTER — OFFICE VISIT (OUTPATIENT)
Dept: FAMILY MEDICINE CLINIC | Age: 60
End: 2025-05-23
Payer: COMMERCIAL

## 2025-05-23 VITALS
HEIGHT: 65 IN | TEMPERATURE: 97.2 F | WEIGHT: 179 LBS | BODY MASS INDEX: 29.82 KG/M2 | DIASTOLIC BLOOD PRESSURE: 84 MMHG | SYSTOLIC BLOOD PRESSURE: 150 MMHG | HEART RATE: 110 BPM | OXYGEN SATURATION: 96 %

## 2025-05-23 DIAGNOSIS — J40 BRONCHITIS: Primary | ICD-10-CM

## 2025-05-23 PROBLEM — Z79.899 IMMUNOSUPPRESSION DUE TO DRUG THERAPY: Status: ACTIVE | Noted: 2025-05-23

## 2025-05-23 PROBLEM — D84.821 IMMUNOSUPPRESSION DUE TO DRUG THERAPY: Status: ACTIVE | Noted: 2025-05-23

## 2025-05-23 PROBLEM — T86.49: Status: ACTIVE | Noted: 2025-05-23

## 2025-05-23 PROBLEM — K76.0: Status: ACTIVE | Noted: 2025-05-23

## 2025-05-23 PROCEDURE — 87798 DETECT AGENT NOS DNA AMP: CPT | Performed by: NURSE PRACTITIONER

## 2025-05-23 RX ORDER — MOXIFLOXACIN 5 MG/ML
SOLUTION/ DROPS OPHTHALMIC
COMMUNITY
Start: 2025-05-19

## 2025-05-23 RX ORDER — AZITHROMYCIN 250 MG/1
TABLET, FILM COATED ORAL
Qty: 6 TABLET | Refills: 0 | Status: SHIPPED | OUTPATIENT
Start: 2025-05-23

## 2025-05-23 RX ORDER — ALBUTEROL SULFATE 90 UG/1
2 INHALANT RESPIRATORY (INHALATION) EVERY 4 HOURS PRN
Qty: 6.7 G | Refills: 0 | Status: SHIPPED | OUTPATIENT
Start: 2025-05-23

## 2025-05-23 RX ORDER — CREAM BASE NO.39
CREAM (GRAM) TOPICAL
COMMUNITY
Start: 2025-05-20

## 2025-05-23 RX ORDER — TRIAMCINOLONE ACETONIDE 40 MG/ML
60 INJECTION, SUSPENSION INTRA-ARTICULAR; INTRAMUSCULAR ONCE
Status: COMPLETED | OUTPATIENT
Start: 2025-05-23 | End: 2025-05-23

## 2025-05-23 RX ORDER — DEXTROMETHORPHAN HYDROBROMIDE AND PROMETHAZINE HYDROCHLORIDE 15; 6.25 MG/5ML; MG/5ML
5 SYRUP ORAL 4 TIMES DAILY PRN
Qty: 180 ML | Refills: 0 | Status: SHIPPED | OUTPATIENT
Start: 2025-05-23

## 2025-05-23 RX ADMIN — TRIAMCINOLONE ACETONIDE 60 MG: 40 INJECTION, SUSPENSION INTRA-ARTICULAR; INTRAMUSCULAR at 16:17

## 2025-05-23 NOTE — PROGRESS NOTES
"Chief Complaint  Cough (Seen on 5/16 by Stephany JIMENEZ not getting better has been going on for 2.5 weeks ) and Bronchitis    Subjective          Verna S Cleo presents to [facility]     Patient is a 6-year-old female is here today to follow-up regarding a cough.  Completed prednisone from last visit and has taken Tessalon Perles with Bromfed-DM and cough is not improving.  Denies mucus production.  Does feel like she is having some wheezing.  Denies fever.    Patient is monitoring blood pressure, reports that blood pressure has been normal at home.  She will continue to monitor her blood pressure.     Objective   Vital Signs:   Vitals:    05/23/25 1533 05/23/25 1545   BP: 179/99 150/84   BP Location: Right arm Left arm   Patient Position: Sitting Sitting   Cuff Size: Adult Adult   Pulse: 110    Temp: 97.2 °F (36.2 °C)    TempSrc: Temporal    SpO2: 96%    Weight: 81.2 kg (179 lb)    Height: 165.1 cm (65\")        Wt Readings from Last 3 Encounters:   05/23/25 81.2 kg (179 lb)   05/16/25 81.6 kg (180 lb)   05/15/25 81.2 kg (179 lb)      BP Readings from Last 3 Encounters:   05/23/25 150/84   05/16/25 172/89   05/15/25 134/73       Body mass index is 29.79 kg/m².             Physical Exam  Vitals reviewed.   Constitutional:       General: She is not in acute distress.     Appearance: Normal appearance. She is well-developed.   HENT:      Right Ear: Tympanic membrane normal.      Left Ear: Tympanic membrane normal.      Mouth/Throat:      Pharynx: No oropharyngeal exudate or posterior oropharyngeal erythema.   Cardiovascular:      Rate and Rhythm: Normal rate and regular rhythm.      Heart sounds: Normal heart sounds.   Pulmonary:      Effort: Pulmonary effort is normal.      Breath sounds: Wheezing present.      Comments: Frequent dry cough  Musculoskeletal:      Right lower leg: No edema.      Left lower leg: No edema.   Skin:     General: Skin is warm and dry.   Neurological: "      General: No focal deficit present.      Mental Status: She is alert.   Psychiatric:         Attention and Perception: Attention normal.         Mood and Affect: Mood and affect normal.         Behavior: Behavior normal.           Current Outpatient Medications:     allopurinol (Zyloprim) 100 MG tablet, Take 1 tablet by mouth Daily., Disp: 30 tablet, Rfl: 5    aspirin 81 MG EC tablet, Take 1 tablet by mouth Daily., Disp: , Rfl:     brimonidine (ALPHAGAN) 0.2 % ophthalmic solution, Administer 1 drop into the left eye 2 (Two) Times a Day., Disp: , Rfl:     Cream Base (VersaPro) cream, , Disp: , Rfl:     cyanocobalamin 1000 MCG/ML injection, ADMINISTER 1 ML IN THE MUSCLE WEEKLY, Disp: , Rfl:     Diclofenac Sodium (VOLTAREN) 1 % gel gel, Apply 4 g topically to the appropriate area as directed 2 (Two) Times a Day., Disp: , Rfl:     dilTIAZem (CARDIZEM) 60 MG tablet, Take 1 tablet by mouth Daily., Disp: , Rfl:     ferrous gluconate (FERGON) 240 (27 FE) MG tablet, Take 1 tablet by mouth Daily., Disp: , Rfl:     fluticasone (FLONASE) 50 MCG/ACT nasal spray, Administer 1 spray into the nostril(s) as directed by provider 2 (Two) Times a Day., Disp: 16 g, Rfl: 0    folic acid (FOLVITE) 1 MG tablet, Take 1 tablet by mouth Daily., Disp: 90 tablet, Rfl: 0    furosemide (LASIX) 20 MG tablet, Take 1 tablet by mouth Daily., Disp: , Rfl:     hydrOXYzine (ATARAX) 25 MG tablet, Take 1 tablet by mouth 3 (Three) Times a Day As Needed for Itching or Anxiety., Disp: 90 tablet, Rfl: 0    Ibuprofen 3 %, Gabapentin 10 %, Baclofen 2 %, lidocaine 4 %, Ketamine HCl 4 %, Apply 1-2 g topically to the appropriate area as directed 3 (Three) to 4 (Four) times daily., Disp: 90 g, Rfl: 2    magnesium oxide (MAG-OX) 400 MG tablet, magnesium oxide 400 mg (241.3 mg magnesium) tablet  TAKE 1 TABLET BY MOUTH EVERY DAY, Disp: , Rfl:     moxifloxacin (VIGAMOX) 0.5 % ophthalmic solution, USE 1 drop IN THE RIGHT EYE THREE TIMES DAILY FOR 5 DAYS., Disp: ,  Rfl:     mycophenolate (MYFORTIC) 360 MG tablet delayed-release EC tablet, Take 2 tablets by mouth 2 (two) times a day., Disp: , Rfl:     ondansetron (ZOFRAN) 4 MG tablet, Take 1 tablet by mouth Every 8 (Eight) Hours As Needed for Nausea or Vomiting., Disp: 30 tablet, Rfl: 0    pantoprazole (PROTONIX) 40 MG EC tablet, Take 1 tablet by mouth Daily., Disp: , Rfl:     potassium chloride (KLOR-CON M20) 20 MEQ CR tablet, Take 1 tablet by mouth Daily., Disp: , Rfl:     Tacrolimus ER (Envarsus XR) 1 MG tablet sustained-release 24 hour, Take 1 tablet by mouth 2 (two) times a day., Disp: , Rfl:     albuterol sulfate  (90 Base) MCG/ACT inhaler, Inhale 2 puffs Every 4 (Four) Hours As Needed for Wheezing., Disp: 6.7 g, Rfl: 0    azithromycin (Zithromax Z-Rubin) 250 MG tablet, Take 2 tablets by mouth on day 1, then 1 tablet daily on days 2-5, Disp: 6 tablet, Rfl: 0    promethazine-dextromethorphan (PROMETHAZINE-DM) 6.25-15 MG/5ML syrup, Take 5 mL by mouth 4 (Four) Times a Day As Needed for Cough. May cause drowsiness, Disp: 180 mL, Rfl: 0  No current facility-administered medications for this visit.   Past Medical History:   Diagnosis Date    Acute sinusitis     Alcohol abuse 12/2015    HOSPITALIZED - ADMITTED ONCE 12/15    Alcoholic cirrhosis of liver without ascites     Anxiety disorder, unspecified     Arthritis     Bariatric surgery status     Cataract     Had surgery on left eye 6/19/2024    Cholelithiasis     Gallbladder removed 1993    Chronic kidney disease, stage 4 (severe)     Difficulty walking 2/2025    My ankles do not flex much when i walk. I usually am limping.    Gout 2/2025    Still is flaring up    History of transfusion     Have had 3 times    Left upper quadrant pain     Liver cancer 06/01/2020    Liver cell carcinoma     Liver transplant status     Low back pain     Lumbago with sciatica, right side     Melena     DENNIS (nonalcoholic steatohepatitis)     Osteopenia 5/16/2025    Other long term (current)  drug therapy     Other microscopic hematuria     Other specified depressive episodes     Pain in unspecified joint     Tuberculosis     HOSPITALIZED X3; WAS 4 YEARS OLD    Zoster without complications      Allergies   Allergen Reactions    Morphine Nausea And Vomiting    Propofol Nausea And Vomiting               Result Review :     Common labs          11/26/2024    10:37 4/1/2025    17:07   Common Labs   Glucose  69    BUN  16    Creatinine  1.09    Sodium  141    Potassium  5.0    Chloride  109    Calcium  9.0    Albumin  3.5    Total Bilirubin  0.3    Alkaline Phosphatase  136    AST (SGOT)  44    ALT (SGPT)  36    WBC 4.9     11.16    Hemoglobin 10.5     11.9    Hematocrit 32.5     37.4    Platelets 437     489       Details          This result is from an external source.                XR Foot 3+ View Left  Result Date: 5/14/2025  Impression: 1. Generalized demineralization limiting detection for fractures. Consider nonemergent DEXA correlation. 2. Degenerative osteoarthritic change, without findings to suggest crystalline or inflammatory arthropathy. 3. Small bilateral plantar spurs. Electronically Signed: Chandrakant Bacon MD  5/14/2025 6:07 PM EDT  Workstation ID: [id number]    XR Foot 3+ View Right  Result Date: 5/14/2025  Impression: 1. Generalized demineralization limiting detection for fractures. Consider nonemergent DEXA correlation. 2. Degenerative osteoarthritic change, without findings to suggest crystalline or inflammatory arthropathy. 3. Small bilateral plantar spurs. Electronically Signed: Chandrakant Bacon MD  5/14/2025 6:07 PM EDT  Workstation ID: [id number]             Social History     Tobacco Use   Smoking Status Never    Passive exposure: Never   Smokeless Tobacco Never           Assessment and Plan    Diagnoses and all orders for this visit:    1. Bronchitis (Primary)  Assessment & Plan:  Due to acute wheezing will administer a Kenalog injection and will also test for pertussis as a precaution.   Will choose Zithromax as her antibiotic and she has used an albuterol inhaler previously and is comfortable with doing so again.  Promethazine DM may cause drowsiness.  Continue continue other symptomatic treatment and monitor very closely.  Consider chest x-ray if not improving.    Orders:  -     triamcinolone acetonide (KENALOG-40) injection 60 mg  -     Bordetella Pertussis PCR - Swab, Nasopharynx; Future  -     azithromycin (Zithromax Z-Rubin) 250 MG tablet; Take 2 tablets by mouth on day 1, then 1 tablet daily on days 2-5  Dispense: 6 tablet; Refill: 0  -     promethazine-dextromethorphan (PROMETHAZINE-DM) 6.25-15 MG/5ML syrup; Take 5 mL by mouth 4 (Four) Times a Day As Needed for Cough. May cause drowsiness  Dispense: 180 mL; Refill: 0  -     albuterol sulfate  (90 Base) MCG/ACT inhaler; Inhale 2 puffs Every 4 (Four) Hours As Needed for Wheezing.  Dispense: 6.7 g; Refill: 0  -     Bordetella Pertussis PCR - Swab, Nasopharynx        Follow Up    No follow-ups on file.  Patient was given instructions and counseling regarding her condition or for health maintenance advice. Please see specific information pulled into the AVS if appropriate.

## 2025-05-23 NOTE — ASSESSMENT & PLAN NOTE
Due to acute wheezing will administer a Kenalog injection and will also test for pertussis as a precaution.  Will choose Zithromax as her antibiotic and she has used an albuterol inhaler previously and is comfortable with doing so again.  Promethazine DM may cause drowsiness.  Continue continue other symptomatic treatment and monitor very closely.  Consider chest x-ray if not improving.

## 2025-05-25 DIAGNOSIS — F32.A ANXIETY AND DEPRESSION: ICD-10-CM

## 2025-05-25 DIAGNOSIS — F41.9 ANXIETY AND DEPRESSION: ICD-10-CM

## 2025-05-26 LAB — B PERT DNA SPEC QL NAA+PROBE: DETECTED

## 2025-05-27 ENCOUNTER — RESULTS FOLLOW-UP (OUTPATIENT)
Dept: FAMILY MEDICINE CLINIC | Age: 60
End: 2025-05-27
Payer: COMMERCIAL

## 2025-05-27 RX ORDER — HYDROXYZINE HYDROCHLORIDE 25 MG/1
25 TABLET, FILM COATED ORAL 3 TIMES DAILY PRN
Qty: 90 TABLET | Refills: 0 | Status: SHIPPED | OUTPATIENT
Start: 2025-05-27

## 2025-05-27 NOTE — PROGRESS NOTES
Please call patient and health dept and notify positive pertussis.  Has already been taking zithromax.  She will need to make a list of persons she has had contact with.  She has been contagious from 21 days from onset of cough or until she has had 5 day course of antibiotics.  Persons that she has had contact with need to contact their health care providers and get advice regarding pertussis exposure.  Health dept will help her with this.  I recommend that she come for tdap vaccine once she is able and feeling better.

## 2025-06-04 DIAGNOSIS — R05.2 SUBACUTE COUGH: Primary | ICD-10-CM

## 2025-06-04 DIAGNOSIS — Z86.19 HISTORY OF PERTUSSIS: ICD-10-CM

## 2025-06-04 RX ORDER — BUDESONIDE AND FORMOTEROL FUMARATE DIHYDRATE 160; 4.5 UG/1; UG/1
2 AEROSOL RESPIRATORY (INHALATION)
Qty: 10.2 G | Refills: 1 | Status: SHIPPED | OUTPATIENT
Start: 2025-06-04

## 2025-06-06 ENCOUNTER — HOSPITAL ENCOUNTER (OUTPATIENT)
Dept: GENERAL RADIOLOGY | Facility: HOSPITAL | Age: 60
Discharge: HOME | End: 2025-06-06
Admitting: NURSE PRACTITIONER
Payer: COMMERCIAL

## 2025-06-06 DIAGNOSIS — Z86.19 HISTORY OF PERTUSSIS: ICD-10-CM

## 2025-06-06 DIAGNOSIS — R05.2 SUBACUTE COUGH: ICD-10-CM

## 2025-06-06 PROCEDURE — 71046 X-RAY EXAM CHEST 2 VIEWS: CPT

## 2025-06-08 DIAGNOSIS — J00 ACUTE RHINITIS: ICD-10-CM

## 2025-06-09 RX ORDER — FLUTICASONE PROPIONATE 50 MCG
1 SPRAY, SUSPENSION (ML) NASAL 2 TIMES DAILY
Qty: 16 G | Refills: 1 | Status: SHIPPED | OUTPATIENT
Start: 2025-06-09

## 2025-06-10 RX ORDER — DOXYCYCLINE 100 MG/1
100 CAPSULE ORAL 2 TIMES DAILY
Qty: 20 CAPSULE | Refills: 0 | Status: SHIPPED | OUTPATIENT
Start: 2025-06-10

## 2025-06-12 ENCOUNTER — TELEPHONE (OUTPATIENT)
Dept: FAMILY MEDICINE CLINIC | Age: 60
End: 2025-06-12
Payer: COMMERCIAL

## 2025-06-12 NOTE — TELEPHONE ENCOUNTER
Pt reminded of over due lab ord by pcp regarding uric acid level, she will try and come in tomorrow to complete. 1st attempt

## 2025-06-17 ENCOUNTER — LAB (OUTPATIENT)
Dept: LAB | Facility: HOSPITAL | Age: 60
End: 2025-06-17
Payer: COMMERCIAL

## 2025-06-17 DIAGNOSIS — M10.9 ACUTE GOUT OF RIGHT FOOT, UNSPECIFIED CAUSE: ICD-10-CM

## 2025-06-17 LAB — URATE SERPL-MCNC: 5.8 MG/DL (ref 2.4–5.7)

## 2025-06-17 PROCEDURE — 84550 ASSAY OF BLOOD/URIC ACID: CPT

## 2025-06-17 PROCEDURE — 36415 COLL VENOUS BLD VENIPUNCTURE: CPT

## 2025-06-18 ENCOUNTER — RESULTS FOLLOW-UP (OUTPATIENT)
Dept: FAMILY MEDICINE CLINIC | Age: 60
End: 2025-06-18
Payer: COMMERCIAL

## 2025-06-18 NOTE — PROGRESS NOTES
Uric acid level greatly improved.  Level has decreased from 9.2 since starting allopurinol.  Continue current dose of allopurinol unless you are still experiencing active symptoms of gout.  If still symptomatic for gout , will need to increase dose of allopurinol.

## 2025-06-18 NOTE — LETTER
Verna Gallo  [address] [facility] [address]    June 18, 2025     Dear Ms. Gallo:    Below are the results from your recent visit:    Resulted Orders   Uric acid   Result Value Ref Range    Uric Acid 5.8 (H) 2.4 - 5.7 mg/dL         Uric acid level greatly improved.  Level has decreased from 9.2 since starting allopurinol.  Continue current dose of allopurinol unless you are still experiencing active symptoms of gout.  If still symptomatic for gout , will need to increase dose of allopurinol.      If you have any questions or concerns, please don't hesitate to call.         Sincerely,        TONY Bush

## 2025-06-24 ENCOUNTER — OFFICE VISIT (OUTPATIENT)
Dept: FAMILY MEDICINE CLINIC | Age: 60
End: 2025-06-24
Payer: COMMERCIAL

## 2025-06-24 VITALS
DIASTOLIC BLOOD PRESSURE: 78 MMHG | SYSTOLIC BLOOD PRESSURE: 136 MMHG | HEIGHT: 65 IN | HEART RATE: 89 BPM | TEMPERATURE: 97.2 F | OXYGEN SATURATION: 100 % | WEIGHT: 179 LBS | BODY MASS INDEX: 29.82 KG/M2

## 2025-06-24 DIAGNOSIS — Z86.19 HISTORY OF PERTUSSIS: ICD-10-CM

## 2025-06-24 DIAGNOSIS — Z87.01 HISTORY OF RECENT PNEUMONIA: ICD-10-CM

## 2025-06-24 DIAGNOSIS — R05.3 CHRONIC COUGH: Primary | ICD-10-CM

## 2025-06-24 PROBLEM — F10.11 HISTORY OF ALCOHOL ABUSE: Status: RESOLVED | Noted: 2022-05-20 | Resolved: 2025-06-24

## 2025-06-24 PROBLEM — K74.60 HEPATIC CIRRHOSIS: Status: RESOLVED | Noted: 2022-05-20 | Resolved: 2025-06-24

## 2025-06-24 PROCEDURE — 99213 OFFICE O/P EST LOW 20 MIN: CPT | Performed by: NURSE PRACTITIONER

## 2025-06-24 RX ORDER — ALBUTEROL SULFATE 0.83 MG/ML
2.5 SOLUTION RESPIRATORY (INHALATION) EVERY 4 HOURS PRN
Qty: 180 ML | Refills: 1 | Status: SHIPPED | OUTPATIENT
Start: 2025-06-24

## 2025-06-24 RX ORDER — METHYLPREDNISOLONE 4 MG/1
TABLET ORAL
Qty: 21 TABLET | Refills: 0 | Status: SHIPPED | OUTPATIENT
Start: 2025-06-24

## 2025-06-24 NOTE — ASSESSMENT & PLAN NOTE
Lungs are clear to auscultation.  Ideally I recommend waiting 4 weeks before rechecking a chest x-ray.  Patient is feeling better but still persisting with some shortness of breath and cough.  We will treat with some prednisone and prescribe albuterol solution for nebulizer.  As long as she is getting some relief we will check chest x-ray next week.  If not improving may repeat chest x-ray this week.

## 2025-06-24 NOTE — PROGRESS NOTES
"Chief Complaint  Pneumonia, Cough, and Shortness of Breath    Subjective          Verna Gallo presents to [facility]     Patient is a 60-year-old female who is here today to follow-up regarding a cough that while improving is persisting.  Diagnosed with pertussis and treated with Zithromax after about 3 weeks of symptoms in May.  Cough persisted.  Chest x-ray June 6 noted right basilar airspace opacity corresponding to either the right middle or right lower lobe.  She was treated then with 10 days of doxycycline.  Cough is dry and albuterol does not even temporarily help relieve her shortness of breath.  She denies postnasal drip or runny nose, fever, or any other associated symptoms.  She does feel better but is not certain if she has improved enough.  She did discover she has a nebulizer at home and would be interested in trying albuterol nebulizer treatments rather than the albuterol inhaler.     Objective   Vital Signs:   Vitals:    06/24/25 1544 06/24/25 1620   BP: 151/83 136/78   BP Location: Left arm Left arm   Patient Position: Sitting Sitting   Cuff Size: Adult Adult   Pulse: 89    Temp: 97.2 °F (36.2 °C)    TempSrc: Temporal    SpO2: 100%    Weight: 81.2 kg (179 lb)    Height: 165.1 cm (65\")        Wt Readings from Last 3 Encounters:   06/24/25 81.2 kg (179 lb)   05/23/25 81.2 kg (179 lb)   05/16/25 81.6 kg (180 lb)      BP Readings from Last 3 Encounters:   06/24/25 136/78   05/23/25 150/84   05/16/25 172/89       Body mass index is 29.79 kg/m².             Physical Exam  Vitals reviewed.   Constitutional:       General: She is not in acute distress.     Appearance: Normal appearance. She is well-developed.   HENT:      Right Ear: Tympanic membrane normal.      Left Ear: Tympanic membrane normal.      Mouth/Throat:      Pharynx: No oropharyngeal exudate or posterior oropharyngeal erythema.   Cardiovascular:      Rate and Rhythm: Normal rate and regular rhythm.      " Heart sounds: Normal heart sounds.   Pulmonary:      Effort: Pulmonary effort is normal.      Breath sounds: Normal breath sounds.   Musculoskeletal:      Right lower leg: No edema.      Left lower leg: No edema.   Skin:     General: Skin is warm and dry.   Neurological:      General: No focal deficit present.      Mental Status: She is alert.   Psychiatric:         Attention and Perception: Attention normal.         Mood and Affect: Mood and affect normal.         Behavior: Behavior normal.           Current Outpatient Medications:     allopurinol (Zyloprim) 100 MG tablet, Take 1 tablet by mouth Daily., Disp: 30 tablet, Rfl: 5    aspirin 81 MG EC tablet, Take 1 tablet by mouth Daily., Disp: , Rfl:     brimonidine (ALPHAGAN) 0.2 % ophthalmic solution, Administer 1 drop into the left eye 2 (Two) Times a Day., Disp: , Rfl:     budesonide-formoterol (Symbicort) 160-4.5 MCG/ACT inhaler, Inhale 2 puffs 2 (Two) Times a Day., Disp: 10.2 g, Rfl: 1    Cream Base (VersaPro) cream, , Disp: , Rfl:     cyanocobalamin 1000 MCG/ML injection, ADMINISTER 1 ML IN THE MUSCLE WEEKLY, Disp: , Rfl:     Diclofenac Sodium (VOLTAREN) 1 % gel gel, Apply 4 g topically to the appropriate area as directed 2 (Two) Times a Day., Disp: , Rfl:     dilTIAZem (CARDIZEM) 60 MG tablet, Take 1 tablet by mouth Daily., Disp: , Rfl:     ferrous gluconate (FERGON) 240 (27 FE) MG tablet, Take 1 tablet by mouth Daily., Disp: , Rfl:     fluticasone (FLONASE) 50 MCG/ACT nasal spray, Administer 1 spray into the nostril(s) as directed by provider 2 (Two) Times a Day., Disp: 16 g, Rfl: 1    folic acid (FOLVITE) 1 MG tablet, Take 1 tablet by mouth Daily., Disp: 90 tablet, Rfl: 0    furosemide (LASIX) 20 MG tablet, Take 1 tablet by mouth Daily., Disp: , Rfl:     hydrOXYzine (ATARAX) 25 MG tablet, Take 1 tablet by mouth 3 (Three) Times a Day As Needed for Itching or Anxiety., Disp: 90 tablet, Rfl: 0    Ibuprofen 3 %, Gabapentin 10 %, Baclofen 2 %, lidocaine 4 %,  Ketamine HCl 4 %, Apply 1-2 g topically to the appropriate area as directed 3 (Three) to 4 (Four) times daily., Disp: 90 g, Rfl: 2    magnesium oxide (MAG-OX) 400 MG tablet, magnesium oxide 400 mg (241.3 mg magnesium) tablet  TAKE 1 TABLET BY MOUTH EVERY DAY, Disp: , Rfl:     mycophenolate (MYFORTIC) 360 MG tablet delayed-release EC tablet, Take 2 tablets by mouth 2 (two) times a day., Disp: , Rfl:     ondansetron (ZOFRAN) 4 MG tablet, Take 1 tablet by mouth Every 8 (Eight) Hours As Needed for Nausea or Vomiting., Disp: 30 tablet, Rfl: 0    pantoprazole (PROTONIX) 40 MG EC tablet, Take 1 tablet by mouth Daily., Disp: , Rfl:     promethazine-dextromethorphan (PROMETHAZINE-DM) 6.25-15 MG/5ML syrup, Take 5 mL by mouth 4 (Four) Times a Day As Needed for Cough. May cause drowsiness, Disp: 180 mL, Rfl: 0    Tacrolimus ER (Envarsus XR) 1 MG tablet sustained-release 24 hour, Take 1 tablet by mouth 2 (two) times a day., Disp: , Rfl:     albuterol (PROVENTIL) (2.5 MG/3ML) 0.083% nebulizer solution, Take 1 vial by nebulization Every 4 (Four) Hours As Needed for Wheezing., Disp: 180 mL, Rfl: 1    albuterol sulfate  (90 Base) MCG/ACT inhaler, Inhale 2 puffs Every 4 (Four) Hours As Needed for Wheezing., Disp: 6.7 g, Rfl: 0    methylPREDNISolone (MEDROL) 4 MG dose pack, Take by mouth as directed on package instructions., Disp: 21 tablet, Rfl: 0   Past Medical History:   Diagnosis Date    Acute sinusitis     Alcohol abuse 12/2015    HOSPITALIZED - ADMITTED ONCE 12/15    Alcoholic cirrhosis of liver without ascites     Allergic     Anxiety disorder, unspecified     Arthritis     Bariatric surgery status     Cataract     Had surgery on left eye 6/19/2024    Cholelithiasis     Gallbladder removed 1993    Chronic kidney disease, stage 4 (severe)     Difficulty walking 2/2025    My ankles do not flex much when i walk. I usually am limping.    Gout 2/2025    Still is flaring up    History of transfusion     Have had 3 times     Left upper quadrant pain     Liver cancer 06/01/2020    Liver cell carcinoma     Liver transplant status     Low back pain     Lumbago with sciatica, right side     Melena     DENNIS (nonalcoholic steatohepatitis)     Osteopenia 5/16/2025    Other long term (current) drug therapy     Other microscopic hematuria     Other specified depressive episodes     Pain in unspecified joint     Tuberculosis     HOSPITALIZED X3; WAS 4 YEARS OLD    Zoster without complications      Allergies   Allergen Reactions    Morphine Nausea And Vomiting    Propofol Nausea And Vomiting               Result Review :     Common labs          11/26/2024    10:37 4/1/2025    17:07 6/17/2025    17:20   Common Labs   Glucose  69     BUN  16     Creatinine  1.09     Sodium  141     Potassium  5.0     Chloride  109     Calcium  9.0     Albumin  3.5     Total Bilirubin  0.3     Alkaline Phosphatase  136     AST (SGOT)  44     ALT (SGPT)  36     WBC 4.9     11.16     Hemoglobin 10.5     11.9     Hematocrit 32.5     37.4     Platelets 437     489     Uric Acid   5.8       Details          This result is from an external source.                XR Chest PA & Lateral  Result Date: 6/9/2025  Impression: Streaky right basilar airspace opacity corresponding to either the right middle lobe or right lower lobe. This could represent atelectasis or pneumonia. Electronically Signed: Faraz Shay MD  6/9/2025 4:26 PM EDT  Workstation ID: [id number]    XR Foot 3+ View Left  Result Date: 5/14/2025  Impression: 1. Generalized demineralization limiting detection for fractures. Consider nonemergent DEXA correlation. 2. Degenerative osteoarthritic change, without findings to suggest crystalline or inflammatory arthropathy. 3. Small bilateral plantar spurs. Electronically Signed: Chandrakant Bacon MD  5/14/2025 6:07 PM EDT  Workstation ID: [id number]    XR Foot 3+ View Right  Result Date: 5/14/2025  Impression: 1. Generalized demineralization limiting detection for  fractures. Consider nonemergent DEXA correlation. 2. Degenerative osteoarthritic change, without findings to suggest crystalline or inflammatory arthropathy. 3. Small bilateral plantar spurs. Electronically Signed: Chandrakant Bacon MD  5/14/2025 6:07 PM EDT  Workstation ID: [id number]             Social History     Tobacco Use   Smoking Status Never    Passive exposure: Never   Smokeless Tobacco Never           Assessment and Plan    Diagnoses and all orders for this visit:    1. Chronic cough (Primary)  Assessment & Plan:  Lungs are clear to auscultation.  Ideally I recommend waiting 4 weeks before rechecking a chest x-ray.  Patient is feeling better but still persisting with some shortness of breath and cough.  We will treat with some prednisone and prescribe albuterol solution for nebulizer.  As long as she is getting some relief we will check chest x-ray next week.  If not improving may repeat chest x-ray this week.      Orders:  -     methylPREDNISolone (MEDROL) 4 MG dose pack; Take by mouth as directed on package instructions.  Dispense: 21 tablet; Refill: 0  -     albuterol (PROVENTIL) (2.5 MG/3ML) 0.083% nebulizer solution; Take 1 vial by nebulization Every 4 (Four) Hours As Needed for Wheezing.  Dispense: 180 mL; Refill: 1  -     XR Chest PA & Lateral; Future    2. History of pertussis    3. History of recent pneumonia        Follow Up    Return if symptoms worsen or fail to improve.  Patient was given instructions and counseling regarding her condition or for health maintenance advice. Please see specific information pulled into the AVS if appropriate.

## 2025-06-26 ENCOUNTER — TREATMENT (OUTPATIENT)
Dept: PHYSICAL THERAPY | Facility: CLINIC | Age: 60
End: 2025-06-26
Payer: COMMERCIAL

## 2025-06-26 DIAGNOSIS — M25.571 CHRONIC PAIN OF BOTH ANKLES: ICD-10-CM

## 2025-06-26 DIAGNOSIS — M25.671 DECREASED RANGE OF MOTION OF BOTH ANKLES: ICD-10-CM

## 2025-06-26 DIAGNOSIS — M62.462 GASTROCNEMIUS EQUINUS OF BOTH LOWER EXTREMITIES: Primary | ICD-10-CM

## 2025-06-26 DIAGNOSIS — M25.672 DECREASED RANGE OF MOTION OF BOTH ANKLES: ICD-10-CM

## 2025-06-26 DIAGNOSIS — R29.898 ANKLE WEAKNESS: ICD-10-CM

## 2025-06-26 DIAGNOSIS — M25.572 CHRONIC PAIN OF BOTH ANKLES: ICD-10-CM

## 2025-06-26 DIAGNOSIS — G89.29 CHRONIC PAIN OF BOTH ANKLES: ICD-10-CM

## 2025-06-26 DIAGNOSIS — M62.461 GASTROCNEMIUS EQUINUS OF BOTH LOWER EXTREMITIES: Primary | ICD-10-CM

## 2025-06-26 NOTE — PROGRESS NOTES
Physical Therapy Initial Evaluation and Plan of Care  [facility], [address], [place] [address]      Patient: Verna Gallo   : 1965  Diagnosis/ICD-10 Code:  Gastrocnemius equinus of both lower extremities [M62.461, M62.462]  Referring practitioner: Eros Jacobs DPM  Date of Initial Visit: 2025  Today's Date: 2025  Patient seen for 1 sessions         Visit Diagnoses:    ICD-10-CM ICD-9-CM   1. Gastrocnemius equinus of both lower extremities  M62.461 728.85    M62.462    2. Chronic pain of both ankles  M25.571 719.47    G89.29 338.29    M25.572    3. Decreased range of motion of both ankles  M25.671 719.57    M25.672    4. Ankle weakness  R29.898 719.67       Subjective Evaluation    History of Present Illness  Mechanism of injury: Verna comes to [facility] with complaints of B foot pain. It started in the ankles, but moved into the foot.    Pt notes that she always had flare ups with gout.  She is on a medication for the gout, which has really helped the pain.  The next thing they wanted to work on is her flexibility in the ankles.  She normally has to limp on the R foot, doesn't flex up all the way.  One of the foot doctors previously wanted to fuse her ankles because it was bone to bone, but pt declined.  Later, found out it was gout.   Dr. Jacobs wants a bone density test, but has not yet been scheduled.      Gout levels came down from a 9 to a 5 since taking the gout medication.     Pt is also a transplant patient, medication could be causing some of the extra white on the x-ray she notes.  She had a liver transplant in 2017.      0/10 pain, but the flexibility is the biggest limitation.   R foot is worse than the L.     Works - [employer],  (wears steel toe, a lot of walking)    Pain  Relieving factors: medications  Aggravating factors: stairs, standing, prolonged positioning and ambulation (flare ups make it worse, if not in a flare up, she is able to  tolerate)    Social Support  Lives in: multiple-level home  Lives with: spouse    Hand dominance: right    Treatments  Previous treatment: physical therapy  Patient Goals  Patient goals for therapy: decreased edema, decreased pain, improved balance, increased motion, increased strength, independence with ADLs/IADLs and return to sport/leisure activities             Objective          Tenderness   Left Ankle/Foot   Tenderness in the Achilles insertion, dorsum foot and lateral malleolus.     Right Ankle/Foot   Tenderness in the Achilles insertion.     Additional Tenderness Details  R calf is very tight to touch, no tenderness noted    L 1,4,5 MTP tender    Increased swelling in the R calf region, pitting edema around the sock line  (Recommended compression socks to help with swelling)    Active Range of Motion   Left Ankle/Foot   Dorsiflexion (ke): 8 degrees   Plantar flexion: 36 degrees   Inversion: 10 degrees   Eversion: 10 degrees     Right Ankle/Foot   Dorsiflexion (ke): 4 degrees   Plantar flexion: 30 degrees   Inversion: 9 degrees   Eversion: 6 degrees     Strength/Myotome Testing     Left Ankle/Foot   Dorsiflexion: 4-  Plantar flexion: 4  Inversion: 4  Eversion: 4    Right Ankle/Foot   Dorsiflexion: 3+  Plantar flexion: 4-  Inversion: 4-  Eversion: 4-          Assessment & Plan       Assessment  Impairments: abnormal gait, abnormal or restricted ROM, activity intolerance, impaired balance, impaired physical strength, lacks appropriate home exercise program, pain with function, safety issue and weight-bearing intolerance   Assessment details: Pt presents with limitations, noted below, that impede her ability to dorsiflex with ROM, squatting, walking long distances, stair navigation. The skills of a therapist will be required to safely and effectively implement the following treatment plan to restore maximal level of function.        Goals  Plan Goals: ANKLE PROBLEMS    1. The patient has limited ROM for the  right ankle.   LTG 1: 12 weeks:  In order to allow the patient greater ease with forward, lateral, and diagonal mobility the patient will demonstrate improved ROM of the right ankle as follows:  20 degrees of dorsiflexion, 50 degrees of plantarflexion, 30 degrees of inversion, and 15 degrees of eversion.  STATUS:  New   STG 1a: 4 weeks:  The patient will demonstrate improved ROM of the right ankle as follows:  10 degrees of dorsiflexion, 45 degrees of plantarflexion, 20 degrees of inversion, and 10 degrees of eversion.    STATUS:  New   TREATMENT: Manual therapy, therapeutic exercise, home exercise instruction, and modalities as needed to include:  moist heat, electrical stimulation, ultrasound, and ice.    2. The patient has limited strength of the right ankle.   LTG 2: 12 weeks:  In order to provide greater joint stability of the right ankle the patient will demonstrate improved strength of the left ankle as follows:  4+/5 dorsiflexion, 4+/5 inversion, 4+/5 eversion, and 4+/5 plantar flexion.    STATUS:  New   STG 2a: 4 weeks:  The patient will demonstrate improved strength of the right ankle as follows:  4/5 dorsiflexion, 4/5 inversion, 4/5 eversion, and 4/5 plantar flexion.    STATUS:  New   TREATMENT: Therapeutic exercise, home exercise instruction, aquatic therapy.    3. The patient has gait dysfunction.   LTG 3: 12 weeks:  The patient will ambulate without assistive device, independently, for community distances with minimal limp to the right lower extremity in order to improve mobility and allow patient to perform activities such as grocery shopping with greater ease.    STATUS:  New   STG 3a: 4 weeks: The patient will be independent in [facility].    STATUS:  New   TREATMENT: Gait training, therapeutic exercise, and home exercise instruction.    4. The patient complains of right ankle pain.  LTG 4: 12 weeks:  The patient will report a pain rating of 0/10 or better in order to improve tolerance to activities of  daily living and improve sleep quality.  STATUS:  New  TREATMENT:  Therapeutic exercises, manual therapy, home exercise   instruction, and modalities as needed for pain to include:  electrical stimulation, moist heat, ice, ultrasound.    5. Mobility: Walking/Moving Around Functional Limitation     LTG 5: 12 weeks:  The patient will demonstrate limitation by achieving a score of 61/80 on the Lower Extremity Functional Scale.    STATUS:  New   STG 5a: 4 weeks:  The patient will demonstrate limitation by achieving a score of 51/80 on the Lower Extremity Functional Scale.      STATUS:  New   TREATMENT:  Manual therapy, therapeutic exercise, home exercise instruction, and modalities as needed to include: moist heat, electrical stimulation, and ultrasound.        Plan  Therapy options: will be seen for skilled therapy services  Planned modality interventions: cryotherapy, dry needling, TENS, thermotherapy (hydrocollator packs) and ultrasound  Planned therapy interventions: abdominal trunk stabilization, ADL retraining, balance/weight-bearing training, body mechanics training, fine motor coordination training, flexibility, functional ROM exercises, gait training, home exercise program, IADL retraining, joint mobilization, manual therapy, motor coordination training, neuromuscular re-education, postural training, stretching, therapeutic activities, soft tissue mobilization and strengthening  Frequency: 2x week  Duration in weeks: 12  Treatment plan discussed with: patient  Plan details: Review HEP, update as needed.    Progress with B ankle strength, trunk control/postural awareness, balance and gait training, coordination, increased stamina, decreased tightness, improved ROM/flexibility, education as needed.            History # of Personal Factors and/or Comorbidities: MODERATE (1-2)  Examination of Body System(s): # of elements: MODERATE (3)  Clinical Presentation: STABLE   Clinical Decision Making: LOW      Timed:  Manual Therapy:    8     mins  54637;  Therapeutic Exercise:    10     mins  85298;     Neuromuscular Jozef:       mins  00270;    Therapeutic Activity:          mins  30128;     Gait Training:           mins  00173;     Ultrasound:                          mins  75011;  Self Care:           mins  21862          Un-timed:  Electrical Stimulation:         mins  11638 ( );  Dry Needling          mins self-pay;  Mechanical Traction           mins  33902;  Low Eval     23     mins 61011;  Moderate Eval          mins 23154;  High Eval          mins 30626;  Re-Eval          mins 75651;  Canalith Repos         mins 62659      Timed Treatment:   18   mins   Total Treatment:     41   mins        PT SIGNATURE: Lanie Finney PT, DPT  [place] License: [id number]  Electronically signed by Lanie Finney PT, 06/26/25, 2:36 PM EDT      Initial Certification    Certification Period: 6/26/2025 thru 9/23/2025  I certify that the therapy services are furnished while this patient is under my care.  The services outlined above are required by this patient, and will be reviewed every 90 days.     PHYSICIAN: Eros Jacobs DPM  NPI: [id number]            PHYSICIAN PRINT NAME: ______________________________________________      PHYSICIAN SIGNATURE: ______________________________________________         DATE:________________________________        Please sign and return via fax to [phone].  Thank you, [facility].

## 2025-07-02 DIAGNOSIS — F32.A ANXIETY AND DEPRESSION: ICD-10-CM

## 2025-07-02 DIAGNOSIS — F41.9 ANXIETY AND DEPRESSION: ICD-10-CM

## 2025-07-03 RX ORDER — HYDROXYZINE HYDROCHLORIDE 25 MG/1
25 TABLET, FILM COATED ORAL 3 TIMES DAILY PRN
Qty: 90 TABLET | Refills: 0 | Status: SHIPPED | OUTPATIENT
Start: 2025-07-03

## 2025-07-10 ENCOUNTER — TREATMENT (OUTPATIENT)
Dept: PHYSICAL THERAPY | Facility: CLINIC | Age: 60
End: 2025-07-10
Payer: COMMERCIAL

## 2025-07-10 ENCOUNTER — LAB (OUTPATIENT)
Dept: LAB | Facility: HOSPITAL | Age: 60
End: 2025-07-10
Payer: COMMERCIAL

## 2025-07-10 ENCOUNTER — HOSPITAL ENCOUNTER (OUTPATIENT)
Dept: GENERAL RADIOLOGY | Facility: HOSPITAL | Age: 60
Discharge: HOME | End: 2025-07-10
Payer: COMMERCIAL

## 2025-07-10 DIAGNOSIS — E55.9 VITAMIN D DEFICIENCY: ICD-10-CM

## 2025-07-10 DIAGNOSIS — M25.672 DECREASED RANGE OF MOTION OF BOTH ANKLES: ICD-10-CM

## 2025-07-10 DIAGNOSIS — Z01.89 LABORATORY PROCEDURES: ICD-10-CM

## 2025-07-10 DIAGNOSIS — G89.29 CHRONIC PAIN OF BOTH ANKLES: ICD-10-CM

## 2025-07-10 DIAGNOSIS — M25.571 CHRONIC PAIN OF BOTH ANKLES: ICD-10-CM

## 2025-07-10 DIAGNOSIS — Z79.899 ENCOUNTER FOR LONG-TERM (CURRENT) USE OF MEDICATIONS: ICD-10-CM

## 2025-07-10 DIAGNOSIS — Z94.4 LIVER REPLACED BY TRANSPLANT: ICD-10-CM

## 2025-07-10 DIAGNOSIS — M62.462 GASTROCNEMIUS EQUINUS OF BOTH LOWER EXTREMITIES: Primary | ICD-10-CM

## 2025-07-10 DIAGNOSIS — M25.572 CHRONIC PAIN OF BOTH ANKLES: ICD-10-CM

## 2025-07-10 DIAGNOSIS — M62.461 GASTROCNEMIUS EQUINUS OF BOTH LOWER EXTREMITIES: Primary | ICD-10-CM

## 2025-07-10 DIAGNOSIS — M25.671 DECREASED RANGE OF MOTION OF BOTH ANKLES: ICD-10-CM

## 2025-07-10 DIAGNOSIS — R29.898 ANKLE WEAKNESS: ICD-10-CM

## 2025-07-10 DIAGNOSIS — R05.3 CHRONIC COUGH: ICD-10-CM

## 2025-07-10 LAB
ALBUMIN SERPL-MCNC: 3.8 G/DL (ref 3.5–5.2)
ALBUMIN/GLOB SERPL: 1.4 G/DL
ALP SERPL-CCNC: 147 U/L (ref 39–117)
ALT SERPL W P-5'-P-CCNC: 43 U/L (ref 1–33)
ANION GAP SERPL CALCULATED.3IONS-SCNC: 8.6 MMOL/L (ref 5–15)
AST SERPL-CCNC: 52 U/L (ref 1–32)
BASOPHILS # BLD AUTO: 0.02 10*3/MM3 (ref 0–0.2)
BASOPHILS NFR BLD AUTO: 0.2 % (ref 0–1.5)
BILIRUB SERPL-MCNC: 0.4 MG/DL (ref 0–1.2)
BUN SERPL-MCNC: 24 MG/DL (ref 8–23)
BUN/CREAT SERPL: 24.7 (ref 7–25)
CALCIUM SPEC-SCNC: 9.7 MG/DL (ref 8.6–10.5)
CHLORIDE SERPL-SCNC: 107 MMOL/L (ref 98–107)
CO2 SERPL-SCNC: 23.4 MMOL/L (ref 22–29)
CREAT SERPL-MCNC: 0.97 MG/DL (ref 0.57–1)
DEPRECATED RDW RBC AUTO: 49.1 FL (ref 37–54)
EGFRCR SERPLBLD CKD-EPI 2021: 67 ML/MIN/1.73
EOSINOPHIL # BLD AUTO: 0.23 10*3/MM3 (ref 0–0.4)
EOSINOPHIL NFR BLD AUTO: 2.8 % (ref 0.3–6.2)
ERYTHROCYTE [DISTWIDTH] IN BLOOD BY AUTOMATED COUNT: 14.7 % (ref 12.3–15.4)
GLOBULIN UR ELPH-MCNC: 2.8 GM/DL
GLUCOSE SERPL-MCNC: 92 MG/DL (ref 65–99)
HCT VFR BLD AUTO: 37.1 % (ref 34–46.6)
HGB BLD-MCNC: 11.1 G/DL (ref 12–15.9)
IMM GRANULOCYTES # BLD AUTO: 0.01 10*3/MM3 (ref 0–0.05)
IMM GRANULOCYTES NFR BLD AUTO: 0.1 % (ref 0–0.5)
LYMPHOCYTES # BLD AUTO: 2.22 10*3/MM3 (ref 0.7–3.1)
LYMPHOCYTES NFR BLD AUTO: 26.7 % (ref 19.6–45.3)
MAGNESIUM SERPL-MCNC: 2.1 MG/DL (ref 1.6–2.4)
MCH RBC QN AUTO: 26.7 PG (ref 26.6–33)
MCHC RBC AUTO-ENTMCNC: 29.9 G/DL (ref 31.5–35.7)
MCV RBC AUTO: 89.4 FL (ref 79–97)
MONOCYTES # BLD AUTO: 0.5 10*3/MM3 (ref 0.1–0.9)
MONOCYTES NFR BLD AUTO: 6 % (ref 5–12)
NEUTROPHILS NFR BLD AUTO: 5.34 10*3/MM3 (ref 1.7–7)
NEUTROPHILS NFR BLD AUTO: 64.2 % (ref 42.7–76)
PLATELET # BLD AUTO: 384 10*3/MM3 (ref 140–450)
PMV BLD AUTO: 8.7 FL (ref 6–12)
POTASSIUM SERPL-SCNC: 4.9 MMOL/L (ref 3.5–5.2)
PROT SERPL-MCNC: 6.6 G/DL (ref 6–8.5)
RBC # BLD AUTO: 4.15 10*6/MM3 (ref 3.77–5.28)
SODIUM SERPL-SCNC: 139 MMOL/L (ref 136–145)
WBC NRBC COR # BLD AUTO: 8.32 10*3/MM3 (ref 3.4–10.8)

## 2025-07-10 PROCEDURE — 36415 COLL VENOUS BLD VENIPUNCTURE: CPT

## 2025-07-10 PROCEDURE — 83735 ASSAY OF MAGNESIUM: CPT

## 2025-07-10 PROCEDURE — 80197 ASSAY OF TACROLIMUS: CPT

## 2025-07-10 PROCEDURE — 85025 COMPLETE CBC W/AUTO DIFF WBC: CPT

## 2025-07-10 PROCEDURE — 71046 X-RAY EXAM CHEST 2 VIEWS: CPT

## 2025-07-10 PROCEDURE — 80053 COMPREHEN METABOLIC PANEL: CPT

## 2025-07-10 NOTE — PROGRESS NOTES
Physical Therapy Daily Treatment Note  [facility], [address], [place] [address]      Patient: Verna Gallo   : 1965  Referring practitioner: Eros Jacobs DPM  Date of Initial Visit: Type: THERAPY  Noted: 2025  Today's Date: 7/10/2025  Patient seen for 2 sessions           Visit Diagnoses:    ICD-10-CM ICD-9-CM   1. Gastrocnemius equinus of both lower extremities  M62.461 728.85    M62.462    2. Chronic pain of both ankles  M25.571 719.47    G89.29 338.29    M25.572    3. Decreased range of motion of both ankles  M25.671 719.57    M25.672    4. Ankle weakness  R29.898 719.67       Subjective Evaluation    History of Present Illness    Subjective comment: She can see a little difference with her range.  She is having some soreness/pulling on the lateral side of her R foot with WB.         Objective   See Exercise, Manual, and Modality Logs for complete treatment.       Assessment & Plan       Assessment  Assessment details: Verna notes that she did have shingles years ago, has constant numbness in the R thigh.  Her leg feels heavy after the activities.  Good tolerance with the ankle ROM exercises.  May add manual next visit, if needed.           Progress per Plan of Care and Progress strengthening /stabilization /functional activity             Timed:  Manual Therapy:         mins  86089;  Therapeutic Exercise:    10     mins  45138;     Neuromuscular Jozef:   12    mins  70912;    Therapeutic Activity:     10     mins  76557;     Gait Training:           mins  87100;     Ultrasound:                          mins  51182;  Self Care:           mins  66876          Un-timed:  Electrical Stimulation:         mins  11713 ( );  Dry Needling          mins self-pay;  Mechanical Traction           mins  40992;  Low Eval          mins 65081;  Moderate Eval          mins 85404;  High Eval          mins 97744;  Re-Eval         mins 80184;  Canalith Repos         mins 92718      Timed  Treatment:   32   mins   Total Treatment:     32   mins    Lanie Finney PT, DPT  [place] License #: [id number]

## 2025-07-14 LAB — TACROLIMUS BLD LC/MS/MS-MCNC: 5.9 NG/ML (ref 5–20)

## 2025-07-16 ENCOUNTER — LAB (OUTPATIENT)
Dept: LAB | Facility: HOSPITAL | Age: 60
End: 2025-07-16
Payer: COMMERCIAL

## 2025-07-16 ENCOUNTER — OFFICE VISIT (OUTPATIENT)
Dept: FAMILY MEDICINE CLINIC | Age: 60
End: 2025-07-16
Payer: COMMERCIAL

## 2025-07-16 VITALS
OXYGEN SATURATION: 98 % | TEMPERATURE: 98.4 F | HEART RATE: 94 BPM | DIASTOLIC BLOOD PRESSURE: 82 MMHG | SYSTOLIC BLOOD PRESSURE: 135 MMHG | BODY MASS INDEX: 29.66 KG/M2 | HEIGHT: 65 IN | WEIGHT: 178 LBS

## 2025-07-16 DIAGNOSIS — R53.83 OTHER FATIGUE: ICD-10-CM

## 2025-07-16 DIAGNOSIS — D64.9 ANEMIA, UNSPECIFIED TYPE: ICD-10-CM

## 2025-07-16 DIAGNOSIS — J02.9 SORE THROAT: Primary | ICD-10-CM

## 2025-07-16 DIAGNOSIS — J02.9 SORE THROAT: ICD-10-CM

## 2025-07-16 DIAGNOSIS — R05.3 CHRONIC COUGH: ICD-10-CM

## 2025-07-16 DIAGNOSIS — R52 BODY ACHES: ICD-10-CM

## 2025-07-16 DIAGNOSIS — R49.0 HOARSENESS: ICD-10-CM

## 2025-07-16 LAB
EXPIRATION DATE: NORMAL
EXPIRATION DATE: NORMAL
FLUAV AG UPPER RESP QL IA.RAPID: NOT DETECTED
FLUBV AG UPPER RESP QL IA.RAPID: NOT DETECTED
FOLATE SERPL-MCNC: >20 NG/ML (ref 4.78–24.2)
HETEROPH AB SER QL LA: NEGATIVE
INTERNAL CONTROL: NORMAL
INTERNAL CONTROL: NORMAL
IRON 24H UR-MRATE: 21 MCG/DL (ref 37–145)
IRON SATN MFR SERPL: 4 % (ref 20–50)
Lab: NORMAL
Lab: NORMAL
S PYO AG THROAT QL: NEGATIVE
SARS-COV-2 AG UPPER RESP QL IA.RAPID: NOT DETECTED
TIBC SERPL-MCNC: 532 MCG/DL (ref 298–536)
TRANSFERRIN SERPL-MCNC: 357 MG/DL (ref 200–360)
TSH SERPL DL<=0.05 MIU/L-ACNC: 2.61 UIU/ML (ref 0.27–4.2)
VIT B12 BLD-MCNC: >2000 PG/ML (ref 211–946)

## 2025-07-16 PROCEDURE — 83540 ASSAY OF IRON: CPT

## 2025-07-16 PROCEDURE — 36415 COLL VENOUS BLD VENIPUNCTURE: CPT

## 2025-07-16 PROCEDURE — 84446 ASSAY OF VITAMIN E: CPT

## 2025-07-16 PROCEDURE — 84466 ASSAY OF TRANSFERRIN: CPT

## 2025-07-16 PROCEDURE — 86308 HETEROPHILE ANTIBODY SCREEN: CPT

## 2025-07-16 PROCEDURE — 87428 SARSCOV & INF VIR A&B AG IA: CPT | Performed by: NURSE PRACTITIONER

## 2025-07-16 PROCEDURE — 82607 VITAMIN B-12: CPT

## 2025-07-16 PROCEDURE — 87081 CULTURE SCREEN ONLY: CPT | Performed by: NURSE PRACTITIONER

## 2025-07-16 PROCEDURE — 99214 OFFICE O/P EST MOD 30 MIN: CPT | Performed by: NURSE PRACTITIONER

## 2025-07-16 PROCEDURE — 82746 ASSAY OF FOLIC ACID SERUM: CPT

## 2025-07-16 PROCEDURE — 84443 ASSAY THYROID STIM HORMONE: CPT

## 2025-07-16 PROCEDURE — 84590 ASSAY OF VITAMIN A: CPT

## 2025-07-16 PROCEDURE — 87880 STREP A ASSAY W/OPTIC: CPT | Performed by: NURSE PRACTITIONER

## 2025-07-16 RX ORDER — NYSTATIN 100000 [USP'U]/ML
500000 SUSPENSION ORAL 4 TIMES DAILY
Qty: 473 ML | Refills: 0 | Status: SHIPPED | OUTPATIENT
Start: 2025-07-16 | End: 2025-07-21

## 2025-07-16 NOTE — ASSESSMENT & PLAN NOTE
For COVID flu or strep.  While I do not see symptoms of thrush on the tongue or oral mucosa, she is reporting some significant throat irritation and discomfort.  We will try nystatin in case she may have developed some thrush of the esophagus due to frequent inhaled medications recently.  Cough is 50% better.  Recent chest x-ray normal.  Referred to ENT so they can look at her vocal cords.  She is on Protonix and acid reflux symptoms are stable.  Denies any current postnasal drip.  Monitor very closely.  Further labs being done due to patient concern of acute onset of fatigue.

## 2025-07-16 NOTE — PROGRESS NOTES
"Chief Complaint  Cough (Ongoing not feeling better states albuterol for nebulizer makes her throat burn, states she has been usuing albeterol treatment daily to every 4 hours since given to her for  pertussis)    Subjective          Verna Gallo presents to [facility]     Patient is a 60-year-old female who presents today for evaluation of more recent onset of sore throat, fatigue and bodyaches over the last several days.  She does continue with a cough that is 50% better since treatment for pertussis.  She initially was treated with Zithromax and doxycycline due to opacity on chest x-ray.  Follow-up chest x-ray is normal and opacities have resolved.  She felt as if using Symbicort or albuterol caused her throat to burn worse so she discontinued use of both Symbicort and albuterol.  She mainly wants to rule out acute infection such as COVID, flu, or strep.  She does still cough sometimes when she feels a tickle in her throat or with talking.  She feels as if her voice is hoarse as the day progresses.  She denies fever.     Objective   Vital Signs:   Vitals:    07/16/25 1621   BP: 135/82   BP Location: Left arm   Patient Position: Sitting   Cuff Size: Adult   Pulse: 94   Temp: 98.4 °F (36.9 °C)   TempSrc: Oral   SpO2: 98%   Weight: 80.7 kg (178 lb)   Height: 165.1 cm (65\")       Wt Readings from Last 3 Encounters:   07/16/25 80.7 kg (178 lb)   06/24/25 81.2 kg (179 lb)   05/23/25 81.2 kg (179 lb)      BP Readings from Last 3 Encounters:   07/16/25 135/82   06/24/25 136/78   05/23/25 150/84       Body mass index is 29.62 kg/m².             Physical Exam  Vitals reviewed.   Constitutional:       General: She is not in acute distress.     Appearance: Normal appearance. She is well-developed.   HENT:      Right Ear: Tympanic membrane normal.      Left Ear: Tympanic membrane normal.      Mouth/Throat:      Pharynx: No oropharyngeal exudate or posterior oropharyngeal erythema. "   Cardiovascular:      Rate and Rhythm: Normal rate and regular rhythm.      Heart sounds: Normal heart sounds.   Pulmonary:      Effort: Pulmonary effort is normal.      Breath sounds: Normal breath sounds.   Musculoskeletal:      Right lower leg: No edema.      Left lower leg: No edema.   Lymphadenopathy:      Cervical: Cervical adenopathy present.   Skin:     General: Skin is warm and dry.   Neurological:      General: No focal deficit present.      Mental Status: She is alert.   Psychiatric:         Attention and Perception: Attention normal.         Mood and Affect: Mood and affect normal.         Behavior: Behavior normal.           Current Outpatient Medications:     allopurinol (Zyloprim) 100 MG tablet, Take 1 tablet by mouth Daily., Disp: 30 tablet, Rfl: 5    aspirin 81 MG EC tablet, Take 1 tablet by mouth Daily., Disp: , Rfl:     brimonidine (ALPHAGAN) 0.2 % ophthalmic solution, Administer 1 drop into the left eye 2 (Two) Times a Day., Disp: , Rfl:     budesonide-formoterol (Symbicort) 160-4.5 MCG/ACT inhaler, Inhale 2 puffs 2 (Two) Times a Day., Disp: 10.2 g, Rfl: 1    Cream Base (VersaPro) cream, , Disp: , Rfl:     cyanocobalamin 1000 MCG/ML injection, ADMINISTER 1 ML IN THE MUSCLE WEEKLY, Disp: , Rfl:     Diclofenac Sodium (VOLTAREN) 1 % gel gel, Apply 4 g topically to the appropriate area as directed 2 (Two) Times a Day., Disp: , Rfl:     dilTIAZem (CARDIZEM) 60 MG tablet, Take 1 tablet by mouth Daily., Disp: , Rfl:     ferrous gluconate (FERGON) 240 (27 FE) MG tablet, Take 1 tablet by mouth Daily., Disp: , Rfl:     fluticasone (FLONASE) 50 MCG/ACT nasal spray, Administer 1 spray into the nostril(s) as directed by provider 2 (Two) Times a Day., Disp: 16 g, Rfl: 1    folic acid (FOLVITE) 1 MG tablet, Take 1 tablet by mouth Daily., Disp: 90 tablet, Rfl: 0    furosemide (LASIX) 20 MG tablet, Take 1 tablet by mouth Daily., Disp: , Rfl:     hydrOXYzine (ATARAX) 25 MG tablet, Take 1 tablet by mouth 3 (Three)  Times a Day As Needed for Itching or Anxiety., Disp: 90 tablet, Rfl: 0    Ibuprofen 3 %, Gabapentin 10 %, Baclofen 2 %, lidocaine 4 %, Ketamine HCl 4 %, Apply 1-2 g topically to the appropriate area as directed 3 (Three) to 4 (Four) times daily., Disp: 90 g, Rfl: 2    magnesium oxide (MAG-OX) 400 MG tablet, magnesium oxide 400 mg (241.3 mg magnesium) tablet  TAKE 1 TABLET BY MOUTH EVERY DAY, Disp: , Rfl:     mycophenolate (MYFORTIC) 360 MG tablet delayed-release EC tablet, Take 2 tablets by mouth 2 (two) times a day., Disp: , Rfl:     ondansetron (ZOFRAN) 4 MG tablet, Take 1 tablet by mouth Every 8 (Eight) Hours As Needed for Nausea or Vomiting., Disp: 30 tablet, Rfl: 0    pantoprazole (PROTONIX) 40 MG EC tablet, Take 1 tablet by mouth Daily., Disp: , Rfl:     promethazine-dextromethorphan (PROMETHAZINE-DM) 6.25-15 MG/5ML syrup, Take 5 mL by mouth 4 (Four) Times a Day As Needed for Cough. May cause drowsiness, Disp: 180 mL, Rfl: 0    Tacrolimus ER (Envarsus XR) 1 MG tablet sustained-release 24 hour, Take 1 tablet by mouth 2 (two) times a day., Disp: , Rfl:     Tetanus-Diphth-Acell Pertussis (Boostrix) 5-2.5-18.5 LF-MCG/0.5 injection, Inject 0.5 mL into the appropriate muscle as directed by prescriber., Disp: 0.5 mL, Rfl: 0    albuterol (PROVENTIL) (2.5 MG/3ML) 0.083% nebulizer solution, Take 1 vial by nebulization Every 4 (Four) Hours As Needed for Wheezing., Disp: 180 mL, Rfl: 1    nystatin (MYCOSTATIN) 100,000 unit/mL suspension, Swish and swallow 5 mL 4 (Four) Times a Day for 5 days., Disp: 473 mL, Rfl: 0   Past Medical History:   Diagnosis Date    Acute sinusitis     Alcohol abuse 12/2015    HOSPITALIZED - ADMITTED ONCE 12/15    Alcoholic cirrhosis of liver without ascites     Allergic     Anxiety disorder, unspecified     Arthritis     Bariatric surgery status     Cataract     Had surgery on left eye 6/19/2024    Cholelithiasis     Gallbladder removed 1993    Chronic kidney disease, stage 4 (severe)      Difficulty walking 2/2025    My ankles do not flex much when i walk. I usually am limping.    Gout 2/2025    Still is flaring up    History of transfusion     Have had 3 times    Left upper quadrant pain     Liver cancer 2016    Liver cell carcinoma     Liver transplant status 2017    Low back pain     Lumbago with sciatica, right side     Melena     DENNIS (nonalcoholic steatohepatitis)     Osteopenia 5/16/2025    Other long term (current) drug therapy     Other microscopic hematuria     Other specified depressive episodes     Pain in unspecified joint     Tuberculosis     HOSPITALIZED X3; WAS 4 YEARS OLD    Zoster without complications      Allergies   Allergen Reactions    Morphine Nausea And Vomiting    Propofol Nausea And Vomiting               Result Review :     Common labs          4/1/2025    17:07 6/17/2025    17:20 7/10/2025    08:14   Common Labs   Glucose 69   92    BUN 16   24.0    Creatinine 1.09   0.97    Sodium 141   139    Potassium 5.0   4.9    Chloride 109   107    Calcium 9.0   9.7    Albumin 3.5   3.8    Total Bilirubin 0.3   0.4    Alkaline Phosphatase 136   147    AST (SGOT) 44   52    ALT (SGPT) 36   43    WBC 11.16   8.32    Hemoglobin 11.9   11.1    Hematocrit 37.4   37.1    Platelets 489   384    Uric Acid  5.8          XR Chest PA & Lateral  Result Date: 7/10/2025  Impression: No acute cardiopulmonary disease. Electronically Signed: Eros Streeter MD  7/10/2025 4:34 PM EDT  Workstation ID: [id number]    XR Chest PA & Lateral  Result Date: 6/9/2025  Impression: Streaky right basilar airspace opacity corresponding to either the right middle lobe or right lower lobe. This could represent atelectasis or pneumonia. Electronically Signed: Faraz Shay MD  6/9/2025 4:26 PM EDT  Workstation ID: [id number]    XR Foot 3+ View Left  Result Date: 5/14/2025  Impression: 1. Generalized demineralization limiting detection for fractures. Consider nonemergent DEXA correlation. 2. Degenerative osteoarthritic  change, without findings to suggest crystalline or inflammatory arthropathy. 3. Small bilateral plantar spurs. Electronically Signed: Chandrakant Bacon MD  5/14/2025 6:07 PM EDT  Workstation ID: [id number]    XR Foot 3+ View Right  Result Date: 5/14/2025  Impression: 1. Generalized demineralization limiting detection for fractures. Consider nonemergent DEXA correlation. 2. Degenerative osteoarthritic change, without findings to suggest crystalline or inflammatory arthropathy. 3. Small bilateral plantar spurs. Electronically Signed: Chandrakant Bacon MD  5/14/2025 6:07 PM EDT  Workstation ID: [id number]             Social History     Tobacco Use   Smoking Status Never    Passive exposure: Never   Smokeless Tobacco Never           Assessment and Plan    Diagnoses and all orders for this visit:    1. Sore throat (Primary)  Assessment & Plan:  For COVID flu or strep.  While I do not see symptoms of thrush on the tongue or oral mucosa, she is reporting some significant throat irritation and discomfort.  We will try nystatin in case she may have developed some thrush of the esophagus due to frequent inhaled medications recently.  Cough is 50% better.  Recent chest x-ray normal.  Referred to ENT so they can look at her vocal cords.  She is on Protonix and acid reflux symptoms are stable.  Denies any current postnasal drip.  Monitor very closely.  Further labs being done due to patient concern of acute onset of fatigue.    Orders:  -     POCT SARS-CoV-2 Antigen JASWINDER + Flu  -     POC Rapid Strep A  -     Beta Strep Culture, Throat - , Throat; Future  -     Beta Strep Culture, Throat - Swab, Throat  -     Ambulatory Referral to ENT (Otolaryngology)  -     nystatin (MYCOSTATIN) 100,000 unit/mL suspension; Swish and swallow 5 mL 4 (Four) Times a Day for 5 days.  Dispense: 473 mL; Refill: 0  -     Mononucleosis Screen; Future    2. Body aches  -     POCT SARS-CoV-2 Antigen JASWINDER + Flu  -     POC Rapid Strep A    3. Chronic cough  -      POCT SARS-CoV-2 Antigen JASWINDER + Flu  -     POC Rapid Strep A    4. Other fatigue  -     Vitamin B12; Future  -     Folate; Future  -     TSH; Future  -     Vitamin A & E; Future    5. Hoarseness  -     Ambulatory Referral to ENT (Otolaryngology)  -     nystatin (MYCOSTATIN) 100,000 unit/mL suspension; Swish and swallow 5 mL 4 (Four) Times a Day for 5 days.  Dispense: 473 mL; Refill: 0    6. Anemia, unspecified type  -     Iron and TIBC; Future        Follow Up    No follow-ups on file.  Patient was given instructions and counseling regarding her condition or for health maintenance advice. Please see specific information pulled into the AVS if appropriate.

## 2025-07-19 LAB — BACTERIA SPEC AEROBE CULT: NORMAL

## 2025-07-22 ENCOUNTER — TREATMENT (OUTPATIENT)
Dept: PHYSICAL THERAPY | Facility: CLINIC | Age: 60
End: 2025-07-22
Payer: COMMERCIAL

## 2025-07-22 DIAGNOSIS — M62.462 GASTROCNEMIUS EQUINUS OF BOTH LOWER EXTREMITIES: Primary | ICD-10-CM

## 2025-07-22 DIAGNOSIS — M25.571 CHRONIC PAIN OF BOTH ANKLES: ICD-10-CM

## 2025-07-22 DIAGNOSIS — R29.898 ANKLE WEAKNESS: ICD-10-CM

## 2025-07-22 DIAGNOSIS — M25.672 DECREASED RANGE OF MOTION OF BOTH ANKLES: ICD-10-CM

## 2025-07-22 DIAGNOSIS — M25.572 CHRONIC PAIN OF BOTH ANKLES: ICD-10-CM

## 2025-07-22 DIAGNOSIS — G89.29 CHRONIC PAIN OF BOTH ANKLES: ICD-10-CM

## 2025-07-22 DIAGNOSIS — M25.671 DECREASED RANGE OF MOTION OF BOTH ANKLES: ICD-10-CM

## 2025-07-22 DIAGNOSIS — M62.461 GASTROCNEMIUS EQUINUS OF BOTH LOWER EXTREMITIES: Primary | ICD-10-CM

## 2025-07-22 LAB
A-TOCOPHEROL VIT E SERPL-MCNC: 12.6 MG/L (ref 9–29)
GAMMA TOCOPHEROL SERPL-MCNC: 1.5 MG/L (ref 0.5–4.9)
VIT A SERPL-MCNC: 61.4 UG/DL (ref 22–69.5)

## 2025-07-22 NOTE — PROGRESS NOTES
Re-Assessment / Re-Certification  [facility] [facility], [address], [place] [address]      Patient: Verna Gallo   : 1965  Diagnosis/ICD-10 Code:  Gastrocnemius equinus of both lower extremities [M62.461, M62.462]  Referring practitioner: Eros Jacobs DPM  Date of Initial Visit: Type: THERAPY  Noted: 2025  Today's Date: 2025  Patient seen for 3 sessions      Subjective:     Visit Diagnoses:    ICD-10-CM ICD-9-CM   1. Gastrocnemius equinus of both lower extremities  M62.461 728.85    M62.462    2. Chronic pain of both ankles  M25.571 719.47    G89.29 338.29    M25.572    3. Decreased range of motion of both ankles  M25.671 719.57    M25.672    4. Ankle weakness  R29.898 719.67         Clinical Progress: improved  Home Program Compliance: Yes  Treatment has included: therapeutic exercise, neuromuscular re-education, manual therapy, therapeutic activity, and gait training      Subjective Evaluation    History of Present Illness  Mechanism of injury: Patient's pain today is 0/10.    Overall, patient feels that PT has been helpful for her.   The exercises/stretches have been helpful.  She is able to walk more at work now.  She even feels like the R leg is getting stronger, able to walk up the step.  She is having less burning.                Objective          Tenderness     Additional Tenderness Details  R calf still has some tightness, but nothing like it was        Active Range of Motion   Left Ankle/Foot   Dorsiflexion (ke): 15 degrees   Plantar flexion: 50 degrees   Inversion: 25 degrees   Eversion: 15 degrees     Right Ankle/Foot   Dorsiflexion (ke): 15 degrees   Plantar flexion: 50 degrees   Inversion: 20 degrees   Eversion: 15 degrees     Strength/Myotome Testing     Left Ankle/Foot   Dorsiflexion: 4+  Plantar flexion: 4+  Inversion: 4+  Eversion: 4+    Right Ankle/Foot   Dorsiflexion: 4+  Plantar flexion: 4+  Inversion: 4+  Eversion: 4+        Assessment & Plan        Assessment  Assessment details: Verna is doing much better with the B ankle pain.  The B ankle ROM has also improved in all directions. She is walking better, less limp.  Balance is still off some, but she notes that her transplant medicine does effect that.  She will be discharged from PT services at this time.  Continue with HEP.      Goals  Plan Goals: ANKLE PROBLEMS    1. The patient has limited ROM for the right ankle.   LTG 1: 12 weeks:  In order to allow the patient greater ease with forward, lateral, and diagonal mobility the patient will demonstrate improved ROM of the right ankle as follows:  20 degrees of dorsiflexion, 50 degrees of plantarflexion, 30 degrees of inversion, and 15 degrees of eversion.  STATUS:  Partially met   STG 1a: 4 weeks:  The patient will demonstrate improved ROM of the right ankle as follows:  10 degrees of dorsiflexion, 45 degrees of plantarflexion, 20 degrees of inversion, and 10 degrees of eversion.    STATUS:  MET   TREATMENT: Manual therapy, therapeutic exercise, home exercise instruction, and modalities as needed to include:  moist heat, electrical stimulation, ultrasound, and ice.    2. The patient has limited strength of the right ankle.   LTG 2: 12 weeks:  In order to provide greater joint stability of the right ankle the patient will demonstrate improved strength of the left ankle as follows:  4+/5 dorsiflexion, 4+/5 inversion, 4+/5 eversion, and 4+/5 plantar flexion.    STATUS:  MET   STG 2a: 4 weeks:  The patient will demonstrate improved strength of the right ankle as follows:  4/5 dorsiflexion, 4/5 inversion, 4/5 eversion, and 4/5 plantar flexion.    STATUS:  MET   TREATMENT: Therapeutic exercise, home exercise instruction, aquatic therapy.    3. The patient has gait dysfunction.   LTG 3: 12 weeks:  The patient will ambulate without assistive device, independently, for community distances with minimal limp to the right lower extremity in order to improve mobility  and allow patient to perform activities such as grocery shopping with greater ease.    STATUS: MET   STG 3a: 4 weeks: The patient will be independent in HEP.    STATUS:  MET   TREATMENT: Gait training, therapeutic exercise, and home exercise instruction.    4. The patient complains of right ankle pain.  LTG 4: 12 weeks:  The patient will report a pain rating of 0/10 or better in order to improve tolerance to activities of daily living and improve sleep quality.  STATUS:  MET  TREATMENT:  Therapeutic exercises, manual therapy, home exercise   instruction, and modalities as needed for pain to include:  electrical stimulation, moist heat, ice, ultrasound.    5. Mobility: Walking/Moving Around Functional Limitation     LTG 5: 12 weeks:  The patient will demonstrate limitation by achieving a score of 61/80 on the Lower Extremity Functional Scale.    STATUS:  MET   STG 5a: 4 weeks:  The patient will demonstrate limitation by achieving a score of 51/80 on the Lower Extremity Functional Scale.      STATUS:  MET   TREATMENT:  Manual therapy, therapeutic exercise, home exercise instruction, and modalities as needed to include: moist heat, electrical stimulation, and ultrasound.        Plan  Treatment plan discussed with: patient  Plan details: Discharge, continue with HEP          Progress toward previous goals: Partially Met       Timed:  Manual Therapy:         mins  52548;  Therapeutic Exercise:    12     mins  94529;     Neuromuscular Jozef:   8    mins  60076;    Therapeutic Activity:     8     mins  50540;     Gait Training:           mins  78253;     Ultrasound:                          mins  35422;  Self Care:           mins  06557          Un-timed:  Electrical Stimulation:         mins  97190 ( );  Dry Needling          mins self-pay;  Mechanical Traction           mins  32979;  Low Eval          mins 85708;  Moderate Eval          mins 69624;  High Eval          mins 12339;  Re-Eval          mins  66058;  [facility]         mins 11597      Timed Treatment:   28   mins   Total Treatment:     30   mins        PT SIGNATURE: Lanie DWAIN Finney, BELEN  [place] License: [id number]     Certification Period: 7/22/2025 thru 10/19/2025  I certify that the therapy services are furnished while this patient is under my care.  The services outlined above are required by this patient, and will be reviewed every 90 days.     PHYSICIAN: Eros Jacobs DPM  NPI: [id number]      PHYSICIAN PRINT NAME: ______________________________________________      PHYSICIAN SIGNATURE: ______________________________________________         DATE:________________________________        Please sign and return via fax to [phone].  Thank you, Psychiatric [facility].

## 2025-08-04 DIAGNOSIS — F41.9 ANXIETY AND DEPRESSION: ICD-10-CM

## 2025-08-04 DIAGNOSIS — F32.A ANXIETY AND DEPRESSION: ICD-10-CM

## 2025-08-04 RX ORDER — HYDROXYZINE HYDROCHLORIDE 25 MG/1
25 TABLET, FILM COATED ORAL 3 TIMES DAILY PRN
Qty: 90 TABLET | Refills: 0 | Status: SHIPPED | OUTPATIENT
Start: 2025-08-04

## 2025-08-07 DIAGNOSIS — J00 ACUTE RHINITIS: ICD-10-CM

## 2025-08-08 RX ORDER — FLUTICASONE PROPIONATE 50 MCG
1 SPRAY, SUSPENSION (ML) NASAL 2 TIMES DAILY
Qty: 16 G | Refills: 2 | Status: SHIPPED | OUTPATIENT
Start: 2025-08-08

## 2025-08-15 ENCOUNTER — TELEPHONE (OUTPATIENT)
Dept: FAMILY MEDICINE CLINIC | Age: 60
End: 2025-08-15
Payer: COMMERCIAL

## 2025-08-15 DIAGNOSIS — M10.9 ACUTE GOUT, UNSPECIFIED CAUSE, UNSPECIFIED SITE: Primary | ICD-10-CM

## 2025-08-15 RX ORDER — METHYLPREDNISOLONE 4 MG/1
TABLET ORAL
Qty: 21 TABLET | Refills: 0 | Status: SHIPPED | OUTPATIENT
Start: 2025-08-15

## 2025-08-19 DIAGNOSIS — M1A.0720 CHRONIC GOUT OF LEFT FOOT, UNSPECIFIED CAUSE: ICD-10-CM

## 2025-08-19 RX ORDER — ALLOPURINOL 100 MG/1
200 TABLET ORAL DAILY
Qty: 180 TABLET | Refills: 1 | Status: SHIPPED | OUTPATIENT
Start: 2025-08-19